# Patient Record
Sex: FEMALE | Race: WHITE | Employment: FULL TIME | URBAN - METROPOLITAN AREA
[De-identification: names, ages, dates, MRNs, and addresses within clinical notes are randomized per-mention and may not be internally consistent; named-entity substitution may affect disease eponyms.]

---

## 2018-05-01 LAB
ANTIBODY: NONREACTIVE
ANTIGEN INTERPRETATION: NONREACTIVE
CHLAMYDIA TRACHOMATIS AMPLIFIED DET: NEGATIVE
HIV AG/AB: NON REACTIVE
N GONORRHOEAE AMPLIFIED DET: NEGATIVE
RPR TITER: NORMAL
RPR: 0.01

## 2018-09-14 LAB
CHOLESTEROL, TOTAL: 198 MG/DL
CHOLESTEROL/HDL RATIO: 102
HDLC SERPL-MCNC: 96 MG/DL (ref 35–70)
LDL CHOLESTEROL CALCULATED: 89 MG/DL (ref 0–160)
TRIGL SERPL-MCNC: 63 MG/DL
VLDLC SERPL CALC-MCNC: ABNORMAL MG/DL

## 2019-10-18 LAB
ALBUMIN SERPL-MCNC: 4.8 G/DL
ALP BLD-CCNC: 68 U/L
ALT SERPL-CCNC: 21 U/L
AST SERPL-CCNC: 21 U/L
BILIRUB SERPL-MCNC: 0.5 MG/DL (ref 0.1–1.4)
BUN BLDV-MCNC: 17 MG/DL
CALCIUM SERPL-MCNC: 10.1 MG/DL
CHLORIDE BLD-SCNC: 100 MMOL/L
CHOLESTEROL, TOTAL: 199 MG/DL
CHOLESTEROL/HDL RATIO: 96
CO2: 27 MMOL/L
CREAT SERPL-MCNC: 0.92 MG/DL
GLUCOSE BLD-MCNC: 77 MG/DL
GLUCOSE FASTING: 77 MG/DL
HDLC SERPL-MCNC: 103 MG/DL (ref 35–70)
LDL CHOLESTEROL CALCULATED: 85 MG/DL (ref 0–160)
POTASSIUM SERPL-SCNC: 4.7 MMOL/L
SODIUM BLD-SCNC: 137 MMOL/L
TOTAL PROTEIN: 6.8 G/DL (ref 6.4–8.2)
TRIGL SERPL-MCNC: 54 MG/DL
TSH SERPL DL<=0.05 MIU/L-ACNC: 2.36 UIU/ML
VLDLC SERPL CALC-MCNC: ABNORMAL MG/DL

## 2020-01-22 ENCOUNTER — OFFICE VISIT (OUTPATIENT)
Dept: PRIMARY CARE CLINIC | Age: 50
End: 2020-01-22
Payer: COMMERCIAL

## 2020-01-22 VITALS
OXYGEN SATURATION: 100 % | DIASTOLIC BLOOD PRESSURE: 84 MMHG | HEART RATE: 60 BPM | HEIGHT: 64 IN | TEMPERATURE: 97 F | WEIGHT: 125 LBS | BODY MASS INDEX: 21.34 KG/M2 | SYSTOLIC BLOOD PRESSURE: 139 MMHG | RESPIRATION RATE: 16 BRPM

## 2020-01-22 PROCEDURE — 99203 OFFICE O/P NEW LOW 30 MIN: CPT | Performed by: PHYSICIAN ASSISTANT

## 2020-01-22 RX ORDER — CEFDINIR 300 MG/1
300 CAPSULE ORAL 2 TIMES DAILY
Qty: 14 CAPSULE | Refills: 0 | Status: SHIPPED | OUTPATIENT
Start: 2020-01-22 | End: 2020-01-29

## 2020-01-22 ASSESSMENT — ENCOUNTER SYMPTOMS
RHINORRHEA: 0
SINUS PRESSURE: 0
SORE THROAT: 0
CHEST TIGHTNESS: 0
SHORTNESS OF BREATH: 0
COUGH: 0

## 2020-02-04 ENCOUNTER — OFFICE VISIT (OUTPATIENT)
Dept: PRIMARY CARE CLINIC | Age: 50
End: 2020-02-04
Payer: COMMERCIAL

## 2020-02-04 VITALS
RESPIRATION RATE: 16 BRPM | BODY MASS INDEX: 21.34 KG/M2 | SYSTOLIC BLOOD PRESSURE: 110 MMHG | WEIGHT: 125 LBS | OXYGEN SATURATION: 100 % | DIASTOLIC BLOOD PRESSURE: 73 MMHG | TEMPERATURE: 97.9 F | HEIGHT: 64 IN | HEART RATE: 67 BPM

## 2020-02-04 PROCEDURE — 99213 OFFICE O/P EST LOW 20 MIN: CPT | Performed by: PHYSICIAN ASSISTANT

## 2020-02-04 ASSESSMENT — ENCOUNTER SYMPTOMS
SINUS PRESSURE: 0
RHINORRHEA: 0
SORE THROAT: 0
SHORTNESS OF BREATH: 0
COUGH: 0
CHEST TIGHTNESS: 0

## 2020-02-10 ENCOUNTER — TELEPHONE (OUTPATIENT)
Dept: FAMILY MEDICINE CLINIC | Age: 50
End: 2020-02-10

## 2020-02-10 ENCOUNTER — PROCEDURE VISIT (OUTPATIENT)
Dept: AUDIOLOGY | Age: 50
End: 2020-02-10
Payer: COMMERCIAL

## 2020-02-10 ENCOUNTER — OFFICE VISIT (OUTPATIENT)
Dept: ENT CLINIC | Age: 50
End: 2020-02-10
Payer: COMMERCIAL

## 2020-02-10 VITALS
HEART RATE: 70 BPM | DIASTOLIC BLOOD PRESSURE: 63 MMHG | TEMPERATURE: 97 F | HEIGHT: 64 IN | WEIGHT: 129.8 LBS | BODY MASS INDEX: 22.16 KG/M2 | SYSTOLIC BLOOD PRESSURE: 103 MMHG

## 2020-02-10 PROBLEM — H73.892 TYMPANIC MEMBRANE RETRACTION, LEFT: Status: ACTIVE | Noted: 2020-02-10

## 2020-02-10 PROBLEM — H74.8X2: Status: ACTIVE | Noted: 2020-02-10

## 2020-02-10 PROCEDURE — 92557 COMPREHENSIVE HEARING TEST: CPT | Performed by: AUDIOLOGIST

## 2020-02-10 PROCEDURE — 92504 EAR MICROSCOPY EXAMINATION: CPT | Performed by: OTOLARYNGOLOGY

## 2020-02-10 PROCEDURE — 99203 OFFICE O/P NEW LOW 30 MIN: CPT | Performed by: OTOLARYNGOLOGY

## 2020-02-10 PROCEDURE — 92567 TYMPANOMETRY: CPT | Performed by: AUDIOLOGIST

## 2020-02-10 RX ORDER — FLUOCINONIDE TOPICAL SOLUTION USP, 0.05% 0.5 MG/ML
SOLUTION TOPICAL 2 TIMES DAILY
COMMUNITY

## 2020-02-10 RX ORDER — SUMATRIPTAN 100 MG/1
100 TABLET, FILM COATED ORAL PRN
COMMUNITY
End: 2021-11-03 | Stop reason: SDUPTHER

## 2020-02-10 RX ORDER — FLUTICASONE PROPIONATE 50 MCG
1 SPRAY, SUSPENSION (ML) NASAL DAILY
COMMUNITY
End: 2020-02-10 | Stop reason: DRUGHIGH

## 2020-02-10 RX ORDER — DESONIDE 0.5 MG/G
CREAM TOPICAL 2 TIMES DAILY
COMMUNITY

## 2020-02-10 RX ORDER — KETOCONAZOLE 20 MG/ML
SHAMPOO TOPICAL DAILY PRN
COMMUNITY

## 2020-02-10 RX ORDER — FLUTICASONE PROPIONATE 50 MCG
1 SPRAY, SUSPENSION (ML) NASAL 2 TIMES DAILY
Qty: 1 BOTTLE | Refills: 1 | Status: SHIPPED | COMMUNITY
Start: 2020-02-10 | End: 2020-05-19 | Stop reason: SDUPTHER

## 2020-02-10 RX ORDER — CETIRIZINE HYDROCHLORIDE 10 MG/1
10 TABLET ORAL DAILY
COMMUNITY

## 2020-02-10 RX ORDER — KETOCONAZOLE 20 MG/G
CREAM TOPICAL DAILY
COMMUNITY

## 2020-02-10 NOTE — Clinical Note
Patient with Ray Castles Tube dysfunction, is also trying to establish primary care at [facility] - will refer her to your office for this

## 2020-02-10 NOTE — Clinical Note
Dr. Thompson Many you for your referral for audiometric testing on this patient. Please see the scanned audiogram (under \"Media\" tab) and encounter note for details. If you have any questions, or if there is anything else you need, please let me know. Chidi Knightiologist---[facility] - Audiology

## 2020-02-10 NOTE — PROGRESS NOTES
[address] [facility] NECK SURGERY  NEW PATIENT HISTORY AND PHYSICAL NOTE      Patient Name: Gilbert Everett  Medical Record Number:  [id number]  Primary Care Physician:  Tamera Newman MD    ChiefComplaint     Chief Complaint   Patient presents with    Tinnitus     Has been going on since she was 18 (slight in left ear). Has a lot of congestion in left ear and ringing is louder and constant. Feels like she can't get ear to clear up. History of Present Illness     Gilbert Everett is an 44 y.o. female with ringing in the left ear since her 25s (had an ear infection, following this had tinnitus in the left ear), feels aural fullness and worsened tinnitus in the left ear. States she had an upper respiratory infection and congestion at the beginning of the year; she was treated with two courses of antibiotics however left aural fullness has not resolved. Denies otalgia, otorrhea, vertigo, no history of ear trauma or surgery. No history of chronic ear disease a sa child,no FHx of early hearing loss. Audiometric testing shows normal hearing loss bilaterally, however left type B tympanogram on immitance testing. States history of seasonal allergies, takes fluticasone, cetirizine year-round. This provides relief of head congestion - denies history of chronic rhinosinusitis (no facial pain, no purulent/green rhinorrhea, no anosmia/hyposmia)    Past Medical History     Past Medical History:   Diagnosis Date    Allergic rhinitis     Anxiety     Headache     Rash     Recurrent upper respiratory infection (URI)     Tinnitus        Past Surgical History     History reviewed. No pertinent surgical history.     Family History     Family History   Problem Relation Age of Onset    No Known Problems Mother     No Known Problems Father     Diabetes Maternal Grandfather     Diabetes Paternal Grandmother        Social History     Social History     Tobacco Use    Smoking status: Never hyphae/fibers in the left external auditory canal  Post op: Q-tip fibers in the left external auditory canal, no inflammation/infection suspected  Procedure : Binocular otomicroscopy  Surgeon: EZEQUIEL Perry  Estimated Blood Loss: None    After obtaining consent, the patient was placed in the examination chair in the reclined position.      -Right ear: External auditory canal clear, tympanic membrane showing no retractions or perforations, middle ear without effusion  -Left ear: External auditory canal with Q-tip fibers appreciated, no fungal hyphae/inflammation,, tympanic membrane obscured by cerumen -portion over the promontory appears retracted, middle ear unable to be evaluated, tympanic membrane does not appear to move well with auto insufflation     * Patient tolerated the procedure well with no complications    Assessment and Plan     1. Tympanic membrane retraction, left  Patient with left tympanic membrane retraction of the promontory, however further evaluation of the remainder the tympanic membrane is difficult due to cerumen/Q-tip fibers.  -We will reevaluate in 6 weeks following a course of eardrops and recommendation for eustachian tube dysfunction as below  -Patient does not smoke, has no history of epistaxes or hyposmia/anosmia. If aural fullness continues, and tympanic membrane remains retracted, will plan for nasal endoscopy to rule out nasopharyngeal mass    2. ETD (Eustachian tube dysfunction), left  Patient with left-sided otalgia, aural fullness, popping/clicking, no autophony.  Symptoms consistent with left dilatory eustachian tube dysfunction  -Start nasal steroids BID  -Auto-insufflate ears (\"pop ears\") 4-5 times daily  -Will consider audiogram to determine any conductive hearing loss  -Have discussed diagnostic myringotomy and/or placement of tympanostomy tube, or Eustachean tube dilation under general anesthesia with patient - will proceed with conservative measures and re-evaluate  -RTC in 6

## 2020-02-10 NOTE — PROGRESS NOTES
Ty Gomez   1970, 44 y.o. female   [id number]       Referring Provider: Elisa Landis MD  Referral Type: In an order in [address] [place] [address]    Reason for Visit: Evaluation of the cause of disorders of hearing and tinnitus. ADULT AUDIOLOGIC EVALUATION      Ty Gomez is a 44 y.o. female seen today, 2/10/2020 , for an initial audiologic evaluation. Patient was seen by Elisa Landis MD following today's evaluation. AUDIOLOGIC AND OTHER PERTINENT MEDICAL HISTORY:      Ty Gomez noted decreased hearing of the left ear described as 'clogged' with intermittent tinnitus of the left ear that worsened after concerns for fluid build-up behind the tympanic membrane a couple months ago. Patient reports tinnitus of left ear started in her 25s after ear infection but appears to have worsened recently. She denies additional history of ear infections as an adult. No additional significant otologic or medical history was reported. Ty Gomez denied otalgia, otorrhea, dizziness, history of falls, history of occupational/recreational noise exposure, history of head trauma, history of ear surgery and family history of hearing loss. Date: 2/10/2020     ASSESSMENT AND FINDINGS:     Otoscopy revealed: Clear ear canals bilaterally    RIGHT EAR:  Hearing Sensitivity: Within normal limits. Speech Recognition Threshold: 5 dB HL  Word Recognition: Excellent (100%), based on NU-6 25-word list at 50 dBHL using recorded speech stimuli. Tympanometry: Normal peak pressure and compliance, Type A tympanogram, consistent with normal middle ear function. LEFT EAR:  Hearing Sensitivity: Within normal limits through 4kHz sloping to a mild hearing loss. Speech Recognition Threshold: 15 dB HL  Word Recognition: Excellent (96%), based on NU-6 25-word list at 50 dBHL using recorded speech stimuli.     Tympanometry: Normal peak pressure with low compliance, Type As tympanogram, consistent with reduced tympanic membrane

## 2020-02-10 NOTE — PATIENT INSTRUCTIONS
directly into a persons ear      Some additional items that may be helpful:   - Remain patient - this is important for both parties   - Write down items that still cannot be heard/understood. You may write with pen/paper or consider typing/texting on a cell phone or smart device. - If background noise is unavoidable, try to keep yourself in a good position in the room. By sitting at a shell on the side of the restaurant (preferably a corner), it will be easier to communicate than if you were sitting at a table in the middle with background noise surrounding you. Keep yourself positioned away from music speakers or heavy foot traffic.

## 2020-02-13 ENCOUNTER — OFFICE VISIT (OUTPATIENT)
Dept: FAMILY MEDICINE CLINIC | Age: 50
End: 2020-02-13
Payer: COMMERCIAL

## 2020-02-13 VITALS
WEIGHT: 129 LBS | BODY MASS INDEX: 22.14 KG/M2 | HEART RATE: 59 BPM | OXYGEN SATURATION: 99 % | DIASTOLIC BLOOD PRESSURE: 70 MMHG | SYSTOLIC BLOOD PRESSURE: 126 MMHG

## 2020-02-13 PROBLEM — G60.9 PERIPHERAL NEUROPATHY, IDIOPATHIC: Status: ACTIVE | Noted: 2018-11-13

## 2020-02-13 PROBLEM — G43.909 MIGRAINE WITHOUT STATUS MIGRAINOSUS, NOT INTRACTABLE: Status: ACTIVE | Noted: 2020-02-13

## 2020-02-13 PROBLEM — A60.00 GENITAL HERPES SIMPLEX: Status: ACTIVE | Noted: 2020-02-13

## 2020-02-13 PROBLEM — F40.243 FEAR OF FLYING: Status: ACTIVE | Noted: 2020-02-13

## 2020-02-13 PROBLEM — M47.812 SPONDYLOSIS WITHOUT MYELOPATHY OR RADICULOPATHY, CERVICAL REGION: Status: ACTIVE | Noted: 2018-11-13

## 2020-02-13 PROBLEM — M47.816 SPONDYLOSIS WITHOUT MYELOPATHY OR RADICULOPATHY, LUMBAR REGION: Status: ACTIVE | Noted: 2018-11-13

## 2020-02-13 PROBLEM — K21.9 ESOPHAGEAL REFLUX: Status: ACTIVE | Noted: 2020-02-13

## 2020-02-13 PROBLEM — G56.03 CARPAL TUNNEL SYNDROME, BILATERAL UPPER LIMBS: Status: ACTIVE | Noted: 2018-11-13

## 2020-02-13 PROCEDURE — 99204 OFFICE O/P NEW MOD 45 MIN: CPT | Performed by: FAMILY MEDICINE

## 2020-02-13 RX ORDER — HYDROXYZINE HYDROCHLORIDE 25 MG/1
12.5-25 TABLET, FILM COATED ORAL
COMMUNITY
Start: 2019-10-31 | End: 2021-11-03 | Stop reason: ALTCHOICE

## 2020-02-13 RX ORDER — AZELASTINE 1 MG/ML
1 SPRAY, METERED NASAL 2 TIMES DAILY
Qty: 1 BOTTLE | Refills: 5 | Status: SHIPPED | OUTPATIENT
Start: 2020-02-13 | End: 2020-07-23 | Stop reason: ALTCHOICE

## 2020-02-13 ASSESSMENT — ENCOUNTER SYMPTOMS
BACK PAIN: 1
COUGH: 0
BLOOD IN STOOL: 0
SHORTNESS OF BREATH: 0
COLOR CHANGE: 0
NAUSEA: 0
VOMITING: 0
TROUBLE SWALLOWING: 0
ABDOMINAL PAIN: 0
CONSTIPATION: 0
DIARRHEA: 0

## 2020-02-13 ASSESSMENT — PATIENT HEALTH QUESTIONNAIRE - PHQ9
SUM OF ALL RESPONSES TO PHQ QUESTIONS 1-9: 0
1. LITTLE INTEREST OR PLEASURE IN DOING THINGS: 0
2. FEELING DOWN, DEPRESSED OR HOPELESS: 0
SUM OF ALL RESPONSES TO PHQ QUESTIONS 1-9: 0
SUM OF ALL RESPONSES TO PHQ9 QUESTIONS 1 & 2: 0

## 2020-02-13 NOTE — PATIENT INSTRUCTIONS
symptoms get better. · Do not smoke or chew tobacco. Smoking can make GERD worse. If you need help quitting, talk to your doctor about stop-smoking programs and medicines. These can increase your chances of quitting for good. · If you have GERD symptoms at night, raise the head of your bed 6 to 8 inches by putting the frame on blocks or placing a foam wedge under the head of your mattress. (Adding extra pillows does not work.)  · Do not wear tight clothing around your middle. · Lose weight if you need to. Losing just 5 to 10 pounds can help. When should you call for help? Call your doctor now or seek immediate medical care if:    · You have new or different belly pain.     · Your stools are black and tarlike or have streaks of blood.    Watch closely for changes in your health, and be sure to contact your doctor if:    · Your symptoms have not improved after 2 days.     · Food seems to catch in your throat or chest.   Where can you learn more? Go to https://[employer]onda Research. org and sign in to your [employer] (now [employer]) account. Enter N492 in the GroupFlier box to learn more about \"Gastroesophageal Reflux Disease (GERD): Care Instructions. \"     If you do not have an account, please click on the \"Sign Up Now\" link. Current as of: August 11, 2019  Content Version: 12.3  © 4826-7183 Healthwise, Incorporated. Care instructions adapted under license by [facility] [address] If you have questions about a medical condition or this instruction, always ask your healthcare professional. [place] [address] any warranty or liability for your use of this information. Patient Education        Back Stretches: Exercises  Introduction  Here are some examples of exercises for stretching your back. Start each exercise slowly. Ease off the exercise if you start to have pain. Your doctor or physical therapist will tell you when you can start these exercises and which ones will work best for you.   How to do the exercises  Overhead stretch   1. Stand comfortably with your feet shoulder-width apart. 2. Looking straight ahead, raise both arms over your head and reach toward the ceiling. Do not allow your head to tilt back. 3. Hold for 15 to 30 seconds, then lower your arms to your sides. 4. Repeat 2 to 4 times. Side stretch   1. Stand comfortably with your feet shoulder-width apart. 2. Raise one arm over your head, and then lean to the other side. 3. Slide your hand down your leg as you let the weight of your arm gently stretch your side muscles. Hold for 15 to 30 seconds. 4. Repeat 2 to 4 times on each side. Press-up   1. Lie on your stomach, supporting your body with your forearms. 2. Press your elbows down into the floor to raise your upper back. As you do this, relax your stomach muscles and allow your back to arch without using your back muscles. As your press up, do not let your hips or pelvis come off the floor. 3. Hold for 15 to 30 seconds, then relax. 4. Repeat 2 to 4 times. Relax and rest   1. Lie on your back with a rolled towel under your neck and a pillow under your knees. Extend your arms comfortably to your sides. 2. Relax and breathe normally. 3. Remain in this position for about 10 minutes. 4. If you can, do this 2 or 3 times each day. Follow-up care is a key part of your treatment and safety. Be sure to make and go to all appointments, and call your doctor if you are having problems. It's also a good idea to know your test results and keep a list of the medicines you take. Where can you learn more? Go to https://Juxta Labsfunmieweb.Gada [employer]. org and sign in to your Gadget[employer]M account. Enter C879 in the Decisionlink box to learn more about \"Back Stretches: Exercises. \"     If you do not have an account, please click on the \"Sign Up Now\" link. Current as of: June 26, 2019  Content Version: 12.3  © 8641-5927 Healthwise, Incorporated.  Care instructions adapted under on a stool, ledge, or box. Exercise to strengthen your back and other muscles  · Get at least 30 minutes of exercise on most days of the week. Walking is a good choice. You also may want to do other activities, such as running, swimming, cycling, or playing tennis or team sports. · Stretch your back muscles. Here are few exercises to try:  ? Lie on your back with your knees bent and your feet flat on the floor. Gently pull one bent knee to your chest. Put that foot back on the floor, and then pull the other knee to your chest. Hold for 15 to 30 seconds. Repeat 2 to 4 times. ? Do pelvic tilts. Lie on your back with your knees bent. Tighten your stomach muscles. Pull your belly button (navel) in and up toward your ribs. You should feel like your back is pressing to the floor and your hips and pelvis are slightly lifting off the floor. Hold for 6 seconds while breathing smoothly. · Keep your core muscles strong. The muscles of your back, belly (abdomen), and buttocks support your spine. ? Pull in your belly, and imagine pulling your navel toward your spine. Hold this for 6 seconds, then relax. Remember to keep breathing normally as you tense your muscles. ? Do curl-ups. Always do them with your knees bent. Keep your low back on the floor, and curl your shoulders toward your knees using a smooth, slow motion. Keep your arms folded across your chest. If this bothers your neck, try putting your hands behind your neck (not your head), with your elbows spread apart. ? Lie on your back with your knees bent and your feet flat on the floor. Tighten your belly muscles, and then push with your feet and raise your buttocks up a few inches. Hold this position 6 seconds as you continue to breathe normally, then lower yourself slowly to the floor. Repeat 8 to 12 times. ? If you like group exercise, try Pilates or yoga. These classes have poses that strengthen the core muscles.   Protect your back when you sit  · Place a small about a medical condition or this instruction, always ask your healthcare professional. [address] any warranty or liability for your use of this information. Patient Education        Low Back Pain: Exercises  Introduction  Here are some examples of exercises for you to try. The exercises may be suggested for a condition or for rehabilitation. Start each exercise slowly. Ease off the exercises if you start to have pain. You will be told when to start these exercises and which ones will work best for you. How to do the exercises  Press-up   1. Lie on your stomach, supporting your body with your forearms. 2. Press your elbows down into the floor to raise your upper back. As you do this, relax your stomach muscles and allow your back to arch without using your back muscles. As your press up, do not let your hips or pelvis come off the floor. 3. Hold for 15 to 30 seconds, then relax. 4. Repeat 2 to 4 times. Alternate arm and leg (bird dog) exercise   1. Start on the floor, on your hands and knees. 2. Tighten your belly muscles. 3. Raise one leg off the floor, and hold it straight out behind you. Be careful not to let your hip drop down, because that will twist your trunk. 4. Hold for about 6 seconds, then lower your leg and switch to the other leg. 5. Repeat 8 to 12 times on each leg. 6. Over time, work up to holding for 10 to 30 seconds each time. 7. If you feel stable and secure with your leg raised, try raising the opposite arm straight out in front of you at the same time. Knee-to-chest exercise   1. Lie on your back with your knees bent and your feet flat on the floor. 2. Bring one knee to your chest, keeping the other foot flat on the floor (or keeping the other leg straight, whichever feels better on your lower back). 3. Keep your lower back pressed to the floor. Hold for at least 15 to 30 seconds.   4. Relax, and lower the knee to the starting 30 seconds. Do not arch your back, point your toes, or bend either knee. Keep one heel touching the floor and the other heel touching the wall. 4. Repeat with your other leg. 5. Do 2 to 4 times for each leg. Hip flexor stretch   1. Kneel on the floor with one knee bent and one leg behind you. Place your forward knee over your foot. Keep your other knee touching the floor. 2. Slowly push your hips forward until you feel a stretch in the upper thigh of your rear leg. 3. Hold the stretch for at least 15 to 30 seconds. Repeat with your other leg. 4. Do 2 to 4 times on each side. Wall sit   1. Stand with your back 10 to 12 inches away from a wall. 2. Lean into the wall until your back is flat against it. 3. Slowly slide down until your knees are slightly bent, pressing your lower back into the wall. 4. Hold for about 6 seconds, then slide back up the wall. 5. Repeat 8 to 12 times. Follow-up care is a key part of your treatment and safety. Be sure to make and go to all appointments, and call your doctor if you are having problems. It's also a good idea to know your test results and keep a list of the medicines you take. Where can you learn more? Go to https://Crowd Vision.Advanced Sports Logic. org and sign in to your Artspace account. Enter B139 in the MarginLeft box to learn more about \"Low Back Pain: Exercises. \"     If you do not have an account, please click on the \"Sign Up Now\" link. Current as of: June 26, 2019  Content Version: 12.3  © 4127-7560 Healthwise, Incorporated. Care instructions adapted under license by [facility] If you have questions about a medical condition or this instruction, always ask your healthcare professional. [place] [address] any warranty or liability for your use of this information.

## 2020-02-13 NOTE — PROGRESS NOTES
unspecified trigger  Switch anti-histamine given length of use, trial astelin  - azelastine (ASTELIN) 0.1 % nasal spray; 1 spray by Nasal route 2 times daily Use in each nostril as directed  Dispense: 1 Bottle; Refill: 5    5. Spondylosis without myelopathy or radiculopathy, lumbar region  Will return to [facility]  Discussed scheduled NSAIDs with food, heat/exercises/ice regiment    6. Situational anxiety  - hydrOXYzine (ATARAX) 25 MG tablet; Take 12.5-25 mg by mouth  7. Fear of flying  - hydrOXYzine (ATARAX) 25 MG tablet; Take 12.5-25 mg by mouth  Discouraged Ativan use    8. Psoriasis  Sees Dermatology     9. Migraine without status migrainosus, not intractable, unspecified migraine type  Imitrex PRN    10. Gastroesophageal reflux disease, esophagitis presence not specified  Pepcid PRN  Discussed dietary influences in detail and provided hand out discussing this. Highly encouraged this as a first step to improvement. If medication was prescribed, AEs described in detail. Long term medication use if no longer recommended. There were no red flags to warrant referral for immediate EGD, including unintentional WL, hematemesis, melena, dysphagia, etc.     11. Genital herpes simplex, unspecified site  Valtrex PRN with Sx onset. While assessing care for this patient, I have reviewed all pertinent lab work/imaging/ specialist notes and care in reference to those problems addressed above in detail. Appropriate medical decision making was based on this. Please note that portions of this note may have been completed with a voice recognition program. Efforts were made to edit the dictations but occasionally words are mis-transcribed.     Return in about 8 months (around 10/20/2020) for annual physical.

## 2020-05-19 RX ORDER — FLUTICASONE PROPIONATE 50 MCG
1 SPRAY, SUSPENSION (ML) NASAL 2 TIMES DAILY
Qty: 1 BOTTLE | Refills: 5 | Status: SHIPPED | OUTPATIENT
Start: 2020-05-19 | End: 2021-02-22

## 2020-05-19 NOTE — TELEPHONE ENCOUNTER
Patient is requesting a 90 day supply of the following pended medication(s) to be sent to [facility]. It is now the patient's preferred pharmacy for all maintenance medications due to cost effectiveness.      [address], [address] [place] [address]  Phone: [phone]

## 2020-07-06 ENCOUNTER — PATIENT MESSAGE (OUTPATIENT)
Dept: FAMILY MEDICINE CLINIC | Age: 50
End: 2020-07-06

## 2020-07-06 RX ORDER — VALACYCLOVIR HYDROCHLORIDE 1 G/1
2000 TABLET, FILM COATED ORAL DAILY
Qty: 90 TABLET | Refills: 0 | Status: SHIPPED | OUTPATIENT
Start: 2020-07-06 | End: 2021-11-03 | Stop reason: SDUPTHER

## 2020-07-06 RX ORDER — BISACODYL 5 MG
TABLET, DELAYED RELEASE (ENTERIC COATED) ORAL
Qty: 4 TABLET | Refills: 0 | Status: SHIPPED | OUTPATIENT
Start: 2020-07-06 | End: 2020-07-23

## 2020-07-06 RX ORDER — POLYETHYLENE GLYCOL 3350 17 G/17G
POWDER, FOR SOLUTION ORAL
Qty: 238 G | Refills: 0 | Status: SHIPPED | OUTPATIENT
Start: 2020-07-06 | End: 2020-07-23 | Stop reason: ALTCHOICE

## 2020-07-06 NOTE — TELEPHONE ENCOUNTER
From: Ovidio Block  To: Juan Shah MD  Sent: 7/6/2020 8:56 AM EDT  Subject: Prescription Question    Hello,  I need to get a refill on Valtrex, but did not see a way to do that through the Request a Refill page, so thought I would try to send you a note. I usually get 1 GM through the mail order pharmacy, so I get the best cost and I don't have to refill very often. I am using the [facility] mail order [facility].     Thanks,  Anat Santos

## 2020-07-13 ENCOUNTER — EMPLOYEE WELLNESS (OUTPATIENT)
Dept: OTHER | Age: 50
End: 2020-07-13

## 2020-07-13 LAB
CHOLESTEROL, TOTAL: 190 MG/DL (ref 0–199)
GLUCOSE BLD-MCNC: 79 MG/DL (ref 70–99)
HDLC SERPL-MCNC: 99 MG/DL (ref 40–60)
LDL CHOLESTEROL CALCULATED: 83 MG/DL
TRIGL SERPL-MCNC: 42 MG/DL (ref 0–150)

## 2020-07-15 ENCOUNTER — E-VISIT (OUTPATIENT)
Dept: INTERNAL MEDICINE CLINIC | Age: 50
End: 2020-07-15
Payer: COMMERCIAL

## 2020-07-15 PROCEDURE — 99422 OL DIG E/M SVC 11-20 MIN: CPT | Performed by: INTERNAL MEDICINE

## 2020-07-15 RX ORDER — FLUCONAZOLE 150 MG/1
150 TABLET ORAL ONCE
Qty: 1 TABLET | Refills: 0 | Status: SHIPPED | OUTPATIENT
Start: 2020-07-15 | End: 2020-07-15

## 2020-07-15 NOTE — PROGRESS NOTES
11-20 minutes were spent on the digital evaluation and management of this patient. Diagnoses and all orders for this visit:    Vaginal yeast infection  -     fluconazole (DIFLUCAN) 150 MG tablet;  Take 1 tablet by mouth once for 1 dose

## 2020-07-16 LAB
3-OH-COTININE: <2 NG/ML
COTININE: <2 NG/ML
NICOTINE: <2 NG/ML

## 2020-07-21 ENCOUNTER — E-VISIT (OUTPATIENT)
Dept: FAMILY MEDICINE CLINIC | Age: 50
End: 2020-07-21

## 2020-07-22 ENCOUNTER — TELEPHONE (OUTPATIENT)
Dept: FAMILY MEDICINE CLINIC | Age: 50
End: 2020-07-22

## 2020-07-22 RX ORDER — FLUCONAZOLE 150 MG/1
150 TABLET ORAL ONCE
Qty: 1 TABLET | Refills: 0 | Status: SHIPPED | OUTPATIENT
Start: 2020-07-22 | End: 2020-07-22

## 2020-07-22 NOTE — PROGRESS NOTES
Reviewed questionnaire    Reviewed meds/allergies    Dx Vaginal discharge    Plan Rx given for diflucan, follow up with PCP if symptoms do not improve with use of med    Time spent on visit 5 min

## 2020-07-22 NOTE — TELEPHONE ENCOUNTER
----- Message from Juan Mckinnon sent at 7/22/2020  1:36 PM EDT -----  Subject: Message to Provider    QUESTIONS  Information for Provider? Pt is calling because she thinks she may have a   UTI or yeast infection. Pt states she has had it for like 2 weeks   symptoms are urgency to urine   discharge( no odor)  She has taken diflucan ( last one 7/15) Please advise. No availability   with Dr. Jericho Nova.  ---------------------------------------------------------------------------  --------------  Jyoti WRIGHT  What is the best way for the office to contact you? Do not leave any   message   patient will call back for answer  Preferred Call Back Phone Number? [phone]  ---------------------------------------------------------------------------  --------------  SCRIPT ANSWERS  Relationship to Patient?  Self

## 2020-07-23 ENCOUNTER — OFFICE VISIT (OUTPATIENT)
Dept: FAMILY MEDICINE CLINIC | Age: 50
End: 2020-07-23
Payer: COMMERCIAL

## 2020-07-23 VITALS
BODY MASS INDEX: 22.14 KG/M2 | SYSTOLIC BLOOD PRESSURE: 120 MMHG | OXYGEN SATURATION: 100 % | HEART RATE: 62 BPM | DIASTOLIC BLOOD PRESSURE: 80 MMHG | TEMPERATURE: 97.4 F | WEIGHT: 129 LBS

## 2020-07-23 PROBLEM — R31.21 ASYMPTOMATIC MICROSCOPIC HEMATURIA: Status: ACTIVE | Noted: 2020-07-23

## 2020-07-23 LAB
BILIRUBIN, POC: NEGATIVE
BLOOD URINE, POC: ABNORMAL
CLARITY, POC: CLEAR
COLOR, POC: YELLOW
GLUCOSE URINE, POC: NEGATIVE
KETONES, POC: NEGATIVE
LEUKOCYTE EST, POC: NEGATIVE
NITRITE, POC: NEGATIVE
PH, POC: 6
PROTEIN, POC: NEGATIVE
SPECIFIC GRAVITY, POC: <=1.005
UROBILINOGEN, POC: NEGATIVE

## 2020-07-23 PROCEDURE — 99214 OFFICE O/P EST MOD 30 MIN: CPT | Performed by: FAMILY MEDICINE

## 2020-07-23 PROCEDURE — 81002 URINALYSIS NONAUTO W/O SCOPE: CPT | Performed by: FAMILY MEDICINE

## 2020-07-23 RX ORDER — FLUCONAZOLE 150 MG/1
150 TABLET ORAL ONCE
Qty: 1 TABLET | Refills: 0 | Status: SHIPPED | OUTPATIENT
Start: 2020-07-23 | End: 2020-07-23

## 2020-07-23 RX ORDER — METRONIDAZOLE 500 MG/1
500 TABLET ORAL 2 TIMES DAILY
Qty: 14 TABLET | Refills: 0 | Status: SHIPPED | OUTPATIENT
Start: 2020-07-23 | End: 2020-07-30

## 2020-07-23 ASSESSMENT — ENCOUNTER SYMPTOMS
COLOR CHANGE: 0
ABDOMINAL PAIN: 1
NAUSEA: 0
VOMITING: 0
BACK PAIN: 0

## 2020-07-23 NOTE — PROGRESS NOTES
Not on file     Relationship status: Not on file    Intimate partner violence     Fear of current or ex partner: Not on file     Emotionally abused: Not on file     Physically abused: Not on file     Forced sexual activity: Not on file   Other Topics Concern    Not on file   Social History Narrative    Not on file       Family History   Problem Relation Age of Onset    No Known Problems Mother     No Known Problems Father     Diabetes Maternal Grandfather     Diabetes Paternal Grandmother        Current Outpatient Medications   Medication Sig Dispense Refill    metroNIDAZOLE (FLAGYL) 500 MG tablet Take 1 tablet by mouth 2 times daily for 7 days 14 tablet 0    fluconazole (DIFLUCAN) 150 MG tablet Take 1 tablet by mouth once for 1 dose 1 tablet 0    valACYclovir (VALTREX) 1 g tablet Take 2 tablets by mouth daily 90 tablet 0    fluticasone (FLONASE) 50 MCG/ACT nasal spray 1 spray by Each Nostril route 2 times daily 1 Bottle 5    hydrOXYzine (ATARAX) 25 MG tablet Take 12.5-25 mg by mouth      cetirizine (ZYRTEC) 10 MG tablet Take 10 mg by mouth daily      SUMAtriptan (IMITREX) 100 MG tablet Take 100 mg by mouth as needed for Migraine      Famotidine (PEPCID PO) Take by mouth as needed      Multiple Vitamin (MULTI-VITAMIN DAILY PO) Take by mouth daily      CALCIUM PO Take by mouth daily      Omega-3 Fatty Acids (FISH OIL PO) Take by mouth daily      ketoconazole (NIZORAL) 2 % shampoo Apply topically daily as needed for Itching (psoriasis) Apply topically daily as needed.  fluocinonide (LIDEX) 0.05 % external solution Apply topically 2 times daily Apply topically 2 times daily.  desonide (DESOWEN) 0.05 % cream Apply topically 2 times daily Apply topically 2 times daily.  ketoconazole (NIZORAL) 2 % cream Apply topically daily Apply topically daily. No current facility-administered medications for this visit.         Immunization History   Administered Date(s) Administered   [facility] Influenza Virus Vaccine 10/11/2011, 11/07/2014, 11/01/2016    Influenza, Adam Adal, IM, PF (6 mo and older Fluzone, Flulaval, Fluarix, and 3 yrs and older Afluria) 10/18/2018    Influenza, Quadv, Recombinant, IM PF (Flublok 18 yrs and older) 10/31/2019    Tdap (Boostrix, Adacel) 09/08/2009, 10/31/2019    Zoster Recombinant (Shingrix) 10/31/2019       No Known Allergies    Review of Systems   Constitutional: Negative for activity change, appetite change, chills, diaphoresis, fatigue, fever and unexpected weight change. Gastrointestinal: Positive for abdominal pain. Negative for nausea and vomiting. Genitourinary: Positive for frequency, urgency and vaginal discharge. Negative for decreased urine volume, difficulty urinating, dyspareunia, dysuria, flank pain, genital sores, hematuria, menstrual problem, pelvic pain, vaginal bleeding and vaginal pain. Musculoskeletal: Negative for back pain and myalgias. Skin: Negative for color change and rash. Allergic/Immunologic: Negative for immunocompromised state. Neurological: Negative for light-headedness and headaches. Psychiatric/Behavioral: Negative for sleep disturbance. /80 (Site: Left Upper Arm, Position: Sitting, Cuff Size: Medium Adult)   Pulse 62   Temp 97.4 °F (36.3 °C) (Temporal)   Wt 129 lb (58.5 kg)   LMP 06/01/2019   SpO2 100%   BMI 22.14 kg/m²     Physical Exam  Vitals signs and nursing note reviewed. Constitutional:       General: She is not in acute distress. Appearance: She is well-developed. She is not diaphoretic. HENT:      Head: Normocephalic and atraumatic. Eyes:      Pupils: Pupils are equal, round, and reactive to light. Neck:      Musculoskeletal: Normal range of motion and neck supple. Cardiovascular:      Rate and Rhythm: Normal rate and regular rhythm. Pulmonary:      Effort: Pulmonary effort is normal. No respiratory distress. Breath sounds: Normal breath sounds.    Abdominal:      General: Bowel sounds are normal.      Palpations: Abdomen is soft. Genitourinary:     Labia:         Right: No rash, tenderness, lesion or injury. Left: No rash, tenderness, lesion or injury. Vagina: No signs of injury and foreign body. Vaginal discharge (moderate, clear ) present. No erythema, tenderness or bleeding. Cervix: Discharge (moderate, clear ) present. No cervical motion tenderness or friability. Musculoskeletal: Normal range of motion. General: No tenderness. Skin:     General: Skin is warm and dry. Capillary Refill: Capillary refill takes 2 to 3 seconds. Coloration: Skin is not pale. Findings: No erythema or rash. Neurological:      General: No focal deficit present. Mental Status: She is alert. Psychiatric:         Mood and Affect: Mood normal.         Behavior: Behavior normal.         Thought Content: Thought content normal.         Judgment: Judgment normal.         Plan  1. Vaginal discharge  Vaginal panel and G/C ordered  Clinically appears to be BV    2. Increased urinary frequency  + trace bl  - POCT Urinalysis no Micro    3. BV (bacterial vaginosis)  Supportive care discussed: increased PO water hydration, clean hygiene, wiping technique, loose fitting cotton underwear, urinating after sexual intercourse, role of probiotics, etc. Discouraged alcohol use   - metroNIDAZOLE (FLAGYL) 500 MG tablet; Take 1 tablet by mouth 2 times daily for 7 days  Dispense: 14 tablet; Refill: 0    4. Asymptomatic microscopic hematuria  Will need repeat UA in 6-8 weeks. Discussed. Likely from pelvic exam as she urinated after. While assessing care for this patient, I have reviewed all pertinent lab work/imaging/ specialist notes and care in reference to those problems addressed above in detail. Appropriate medical decision making was based on this.      Please note that portions of this note may have been completed with a voice recognition program. Efforts were made

## 2020-07-23 NOTE — PATIENT INSTRUCTIONS
Patient Education        Knee: Exercises  Introduction  Here are some examples of exercises for you to try. The exercises may be suggested for a condition or for rehabilitation. Start each exercise slowly. Ease off the exercises if you start to have pain. You will be told when to start these exercises and which ones will work best for you. How to do the exercises  Quad sets   1. Sit with your leg straight and supported on the floor or a firm bed. (If you feel discomfort in the front or back of your knee, place a small towel roll under your knee.)  2. Tighten the muscles on top of your thigh by pressing the back of your knee flat down to the floor. (If you feel discomfort under your kneecap, place a small towel roll under your knee.)  3. Hold for about 6 seconds, then rest for up to 10 seconds. 4. Do 8 to 12 repetitions several times a day. Straight-leg raises to the front   1. Lie on your back with your good knee bent so that your foot rests flat on the floor. Your injured leg should be straight. Make sure that your low back has a normal curve. You should be able to slip your flat hand in between the floor and the small of your back, with your palm touching the floor and your back touching the back of your hand. 2. Tighten the thigh muscles in the injured leg by pressing the back of your knee flat down to the floor. Hold your knee straight. 3. Keeping the thigh muscles tight, lift your injured leg up so that your heel is about 12 inches off the floor. Hold for about 6 seconds and then lower slowly. 4. Do 8 to 12 repetitions, 3 times a day. Straight-leg raises to the outside   1. Lie on your side, with your injured leg on top. 2. Tighten the front thigh muscles of your injured leg to keep your knee straight. 3. Keep your hip and your leg straight in line with the rest of your body, and keep your knee pointing forward. Do not drop your hip back.   4. Lift your injured leg straight up toward the ceiling, about 12 inches off the floor. Hold for about 6 seconds, then slowly lower your leg. 5. Do 8 to 12 repetitions. Straight-leg raises to the back   1. Lie on your stomach, and lift your leg straight up behind you (toward the ceiling). 2. Lift your toes about 6 inches off the floor, hold for about 6 seconds, then lower slowly. 3. Do 8 to 12 repetitions. Straight-leg raises to the inside   1. Lie on the side of your body with the injured leg. 2. You can either prop your other (good) leg up on a chair, or you can bend your good knee and put that foot in front of your injured knee. Do not drop your hip back. 3. Tighten the muscles on the front of your thigh to straighten your injured knee. 4. Keep your kneecap pointing forward, and lift your whole leg up toward the ceiling about 6 inches. Hold for about 6 seconds, then lower slowly. 5. Do 8 to 12 repetitions. Heel dig bridging   1. Lie on your back with both knees bent and your ankles bent so that only your heels are digging into the floor. Your knees should be bent about 90 degrees. 2. Then push your heels into the floor, squeeze your buttocks, and lift your hips off the floor until your shoulders, hips, and knees are all in a straight line. 3. Hold for about 6 seconds as you continue to breathe normally, and then slowly lower your hips back down to the floor and rest for up to 10 seconds. 4. Do 8 to 12 repetitions. Hamstring curls   1. Lie on your stomach with your knees straight. If your kneecap is uncomfortable, roll up a washcloth and put it under your leg just above your kneecap. 2. Lift the foot of your injured leg by bending the knee so that you bring the foot up toward your buttock. If this motion hurts, try it without bending your knee quite as far. This may help you avoid any painful motion. 3. Slowly lower your leg back to the floor. 4. Do 8 to 12 repetitions.   5. With permission from your doctor or physical therapist, you may also want to add a cuff weight to your ankle (not more than 5 pounds). With weight, you do not have to lift your leg more than 12 inches to get a hamstring workout. Shallow standing knee bends   Do this exercise only if you have very little pain; if you have no clicking, locking, or giving way if you have an injured knee; and if it does not hurt while you are doing 8 to 12 repetitions. 1. Stand with your hands lightly resting on a counter or chair in front of you. Put your feet shoulder-width apart. 2. Slowly bend your knees so that you squat down like you are going to sit in a chair. Make sure your knees do not go in front of your toes. 3. Lower yourself about 6 inches. Your heels should remain on the floor at all times. 4. Rise slowly to a standing position. Heel raises   1. Stand with your feet a few inches apart, with your hands lightly resting on a counter or chair in front of you. 2. Slowly raise your heels off the floor while keeping your knees straight. 3. Hold for about 6 seconds, then slowly lower your heels to the floor. 4. Do 8 to 12 repetitions several times during the day. Follow-up care is a key part of your treatment and safety. Be sure to make and go to all appointments, and call your doctor if you are having problems. It's also a good idea to know your test results and keep a list of the medicines you take. Where can you learn more? Go to https://[employer].SkiApps.com. org and sign in to your [employer] account. Enter O828 in the [facility] box to learn more about \"Knee: Exercises. \"     If you do not have an account, please click on the \"Sign Up Now\" link. Current as of: March 2, 2020               Content Version: 12.5  © 2732-6126 Healthwise, Incorporated. Care instructions adapted under license by [facility] [address]  If you have questions about a medical condition or this instruction, always ask your healthcare professional. [address] any warranty or liability for your use of this information. Patient Education        Patellar Tendinitis (Jumper's Knee): Exercises  Introduction  Here are some examples of exercises for you to try. The exercises may be suggested for a condition or for rehabilitation. Start each exercise slowly. Ease off the exercises if you start to have pain. You will be told when to start these exercises and which ones will work best for you. How to do the exercises  Straight-leg raises to the front   5. Lie on your back with your good knee bent so that your foot rests flat on the floor. Your affected leg should be straight. Make sure that your low back has a normal curve. You should be able to slip your hand in between the floor and the small of your back, with your palm touching the floor and your back touching the back of your hand. 6. Tighten the thigh muscles in your affected leg by pressing the back of your knee flat down to the floor. Hold your knee straight. 7. Keeping the thigh muscles tight and your leg straight, lift your leg up so that your heel is about 12 inches off the floor. 8. Hold for about 6 seconds, then lower your leg slowly. Rest for up to 10 seconds between repetitions. 9. Repeat 8 to 12 times. Short-arc quad   5. Lie on your back with your knees bent over a foam roll or large rolled-up towel and your heels on the floor. 6. Lift the lower part of your affected leg until your leg is straight. Keep the back of your knee on the foam roll or towel. 7. Hold your leg straight for about 6 seconds, then slowly bend your knee and lower your heel back to the floor. Rest for up to 10 seconds between repetitions. 8. Repeat 8 to 12 times. Half-squat with knees and feet turned out to the side   6. Stand with your feet about shoulder-width apart and turned out to the side about 45 degrees. 7. Keep your back straight, and tighten your buttocks.   8. Slowly bend your knees to lower your body about one-quarter of the way down toward the floor. Try to keep your back straight at all times, and do not let your pelvis tilt forward or your knees extend beyond the tip of your toes. 9. Repeat 8 to 12 times. Step up   4. Place a single-step footstool on the floor. If you do not have a footstool, you can use a thick book, such as a phone book, a dictionary, or an encyclopedia. If you are not steady on your feet, hold on to a chair, counter, or wall while you do this exercise. 5. Keeping your back straight, step up with your affected leg. Try not to push off your back leg as you step up. Only use your affected leg to bring you up on to the step. Then lift your other leg on to the step. As you step up, try to keep your knee moving in a straight line with your middle toe. 6. Move back to the starting position, with both feet on the floor. 7. Repeat 8 to 12 times. Step down   6. Stand on a single-step footstool. If you do not have a footstool, you can use a thick book, such as a phone book, a dictionary, or an encyclopedia. If you are not steady on your feet, hold on to a chair, counter, or wall while you do this exercise. 7. Slowly step down with your good leg, allowing your heel to lightly touch the floor. As you step down, try to keep your affected knee moving in a straight line toward your middle toe. 8. Move back to the starting position, with both feet on the footstool or book. 9. Repeat 8 to 12 times. Terminal knee extension   5. Tie the ends of an exercise band together to form a loop. Attach one end of the loop to a secure object, or shut a door on it to hold it in place. (Or you can have someone hold one end of the loop to provide resistance.)  6. Loop the other end of the exercise band around the knee of your affected leg. Keep that leg somewhat bent at the knee. 7. Put your good leg about a step behind your affected leg.  Then slowly straighten your affected leg by tightening the thigh muscles of that leg. 8. Hold for about 6 seconds, then return to the starting position, with your knee somewhat bent. 9. Rest for up to 10 seconds. 10. Repeat 8 to 12 times. Follow-up care is a key part of your treatment and safety. Be sure to make and go to all appointments, and call your doctor if you are having problems. It's also a good idea to know your test results and keep a list of the medicines you take. Where can you learn more? Go to https://[employer]peLastline.[employer]. org and sign in to your [employer] account. Enter S680 in the [facility] box to learn more about \"Patellar Tendinitis (Jumper's Knee): Exercises. \"     If you do not have an account, please click on the \"Sign Up Now\" link. Current as of: March 2, 2020               Content Version: 12.5  © 2006-2020 [employer]. Care instructions adapted under license by [facility] ([facility]). If you have questions about a medical condition or this instruction, always ask your healthcare professional. [address] any warranty or liability for your use of this information. Patient Education        Patellofemoral Pain Syndrome (Runner's Knee): Exercises  Introduction  Here are some examples of exercises for you to try. The exercises may be suggested for a condition or for rehabilitation. Start each exercise slowly. Ease off the exercises if you start to have pain. You will be told when to start these exercises and which ones will work best for you. How to do the exercises  Calf wall stretch   10. Stand facing a wall with your hands on the wall at about eye level. Put your affected leg about a step behind your other leg. 6. Keeping your back leg straight and your back heel on the floor, bend your front knee and gently bring your hip and chest toward the wall until you feel a stretch in the calf of your back leg. 12. Hold the stretch for at least 15 to 30 seconds. 13. Repeat 2 to 4 times.   14. Repeat steps 1 through 4, but this time keep your back knee bent. Quadriceps stretch   9. If you are not steady on your feet, hold on to a chair, counter, or wall. [address] your affected leg, and reach behind you to grab the front of your foot or ankle with the hand on the same side. For example, if you are stretching your right leg, use your right hand. 11. Keeping your knees next to each other, pull your foot toward your buttock until you feel a gentle stretch across the front of your hip and down the front of your thigh. Your knee should be pointed directly to the ground, and not out to the side. 12. Hold the stretch for at least 15 to 30 seconds. 13. Repeat 2 to 4 times. Hamstring wall stretch   10. Lie on your back in a doorway, with your good leg through the open door. 11. Slide your affected leg up the wall to straighten your knee. You should feel a gentle stretch down the back of your leg. 12. Hold the stretch for at least 1 minute. Then over time, try to lengthen the time you hold the stretch to as long as 6 minutes. 13. Repeat 2 to 4 times. 14. If you do not have a place to do this exercise in a doorway, there is another way to do it:  15. Lie on your back, and bend your affected leg. 16. Loop a towel under the ball and toes of that foot, and hold the ends of the towel in your hands. 17. Straighten your knee, and slowly pull back on the towel. You should feel a gentle stretch down the back of your leg. 18. Hold the stretch for at least 15 to 30 seconds. Or even better, hold the stretch for 1 minute if you can. 19. Repeat 2 to 4 times. 8. Do not arch your back. 9. Do not bend either knee. 10. Keep one heel touching the floor and the other heel touching the wall. Do not point your toes. Quad sets   10. Sit with your affected leg straight and supported on the floor or a firm bed. Place a small, rolled-up towel under your affected knee.  Your other leg should be bent, with that foot flat on the better. You need to take the full course of antibiotics. · Do not eat or drink anything that contains alcohol if you are taking metronidazole or tinidazole. · Keep using your medicine if you start your period. Use pads instead of tampons while using a vaginal cream or suppository. Tampons can absorb the medicine. · Wear loose cotton clothing. Do not wear nylon and other materials that hold body heat and moisture close to the skin. · Do not scratch. Relieve itching with a cold pack or a cool bath. · Do not wash your vaginal area more than once a day. Use plain water or a mild, unscented soap. Do not douche. When should you call for help? Watch closely for changes in your health, and be sure to contact your doctor if:  · You have unexpected vaginal bleeding. · You have a fever. · You have new or increased pain in your vagina or pelvis. · You are not getting better after 1 week. · Your symptoms return after you finish the course of your medicine. Where can you learn more? Go to https://[employer].too.me. org and sign in to your [employer] account. Enter M311 in the [facility] box to learn more about \"Bacterial Vaginosis: Care Instructions. \"     If you do not have an account, please click on the \"Sign Up Now\" link. Current as of: November 8, 2019               Content Version: 12.5  © 9097-2458 Healthwise, Incorporated. Care instructions adapted under license by [address] If you have questions about a medical condition or this instruction, always ask your healthcare professional. [place] [address] any warranty or liability for your use of this information.

## 2020-07-24 LAB
C TRACH DNA GENITAL QL NAA+PROBE: NEGATIVE
CANDIDA SPECIES, DNA PROBE: NORMAL
GARDNERELLA VAGINALIS, DNA PROBE: NORMAL
N. GONORRHOEAE DNA: NEGATIVE
TRICHOMONAS VAGINALIS DNA: NORMAL

## 2020-08-03 ENCOUNTER — OFFICE VISIT (OUTPATIENT)
Dept: PRIMARY CARE CLINIC | Age: 50
End: 2020-08-03
Payer: COMMERCIAL

## 2020-08-03 PROCEDURE — 99211 OFF/OP EST MAY X REQ PHY/QHP: CPT | Performed by: NURSE PRACTITIONER

## 2020-08-03 NOTE — PROGRESS NOTES
Prudence Meals received a viral test for COVID-19. They were educated on isolation and quarantine as appropriate. For any symptoms, they were directed to seek care from their PCP, given contact information to establish with a doctor, directed to an urgent care or the emergency room.

## 2020-08-04 LAB — SARS-COV-2, NAA: NOT DETECTED

## 2020-08-07 ENCOUNTER — HOSPITAL ENCOUNTER (OUTPATIENT)
Age: 50
Setting detail: OUTPATIENT SURGERY
Discharge: HOME | End: 2020-08-07
Attending: INTERNAL MEDICINE | Admitting: INTERNAL MEDICINE
Payer: COMMERCIAL

## 2020-08-07 VITALS
RESPIRATION RATE: 16 BRPM | OXYGEN SATURATION: 100 % | SYSTOLIC BLOOD PRESSURE: 106 MMHG | DIASTOLIC BLOOD PRESSURE: 65 MMHG | HEART RATE: 53 BPM | TEMPERATURE: 97.6 F

## 2020-08-07 PROCEDURE — 7100000011 HC PHASE II RECOVERY - ADDTL 15 MIN: Performed by: INTERNAL MEDICINE

## 2020-08-07 PROCEDURE — 3609027000 HC COLONOSCOPY: Performed by: INTERNAL MEDICINE

## 2020-08-07 PROCEDURE — 2709999900 HC NON-CHARGEABLE SUPPLY: Performed by: INTERNAL MEDICINE

## 2020-08-07 PROCEDURE — 7100000010 HC PHASE II RECOVERY - FIRST 15 MIN: Performed by: INTERNAL MEDICINE

## 2020-08-07 PROCEDURE — 99152 MOD SED SAME PHYS/QHP 5/>YRS: CPT | Performed by: INTERNAL MEDICINE

## 2020-08-07 PROCEDURE — 45378 DIAGNOSTIC COLONOSCOPY: CPT | Performed by: INTERNAL MEDICINE

## 2020-08-07 PROCEDURE — 2580000003 HC RX 258: Performed by: INTERNAL MEDICINE

## 2020-08-07 PROCEDURE — 99153 MOD SED SAME PHYS/QHP EA: CPT | Performed by: INTERNAL MEDICINE

## 2020-08-07 PROCEDURE — 6360000002 HC RX W HCPCS: Performed by: INTERNAL MEDICINE

## 2020-08-07 RX ORDER — SODIUM CHLORIDE, SODIUM LACTATE, POTASSIUM CHLORIDE, CALCIUM CHLORIDE 600; 310; 30; 20 MG/100ML; MG/100ML; MG/100ML; MG/100ML
INJECTION, SOLUTION INTRAVENOUS ONCE
Status: COMPLETED | OUTPATIENT
Start: 2020-08-07 | End: 2020-08-07

## 2020-08-07 RX ORDER — MIDAZOLAM HYDROCHLORIDE 5 MG/ML
INJECTION INTRAMUSCULAR; INTRAVENOUS PRN
Status: DISCONTINUED | OUTPATIENT
Start: 2020-08-07 | End: 2020-08-07 | Stop reason: ALTCHOICE

## 2020-08-07 RX ORDER — FENTANYL CITRATE 50 UG/ML
INJECTION, SOLUTION INTRAMUSCULAR; INTRAVENOUS PRN
Status: DISCONTINUED | OUTPATIENT
Start: 2020-08-07 | End: 2020-08-07 | Stop reason: ALTCHOICE

## 2020-08-07 RX ADMIN — SODIUM CHLORIDE, POTASSIUM CHLORIDE, SODIUM LACTATE AND CALCIUM CHLORIDE: 600; 310; 30; 20 INJECTION, SOLUTION INTRAVENOUS at 07:54

## 2020-08-07 ASSESSMENT — PAIN - FUNCTIONAL ASSESSMENT: PAIN_FUNCTIONAL_ASSESSMENT: 0-10

## 2020-08-07 ASSESSMENT — PAIN SCALES - GENERAL
PAINLEVEL_OUTOF10: 0

## 2020-08-07 NOTE — H&P
Christophe [address],  [address] [facility]  Phone: [phone] [facility],  [address] [facility], [address] [place] [address]  Phone: [phone]    HPI     Thank you Akosua Espinoza MD for asking me to see Mike Dudley in consultation. She is a  [4] White [1] 44 y.o. Collette Mallorys female seen independently who presents with the following GI complaints:    Mike Dudley presents for screening colonoscopy. There is no history of colon polyps or cancer. There is no family history of colon polyps or cancer. She is not experiencing any symptoms presently. Last Encounter Reviewed:   Pertinent PMH, FH, SH is reviewed below. Last EGD: none  Last Colonoscopy: none    No components found for: [address] HISTORY     Past Medical History:   Diagnosis Date    Allergic rhinitis     Anxiety     Esophageal reflux 2/13/2020    Headache     Rash     Recurrent upper respiratory infection (URI)     Spondylosis without myelopathy or radiculopathy, cervical region 11/13/2018    Tinnitus      FAMILY HISTORY     Family History   Problem Relation Age of Onset    No Known Problems Mother     No Known Problems Father     Diabetes Maternal Grandfather     Diabetes Paternal Grandmother      SOCIAL HISTORY     Social History     Socioeconomic History    Marital status:      Spouse name: Not on file    Number of children: Not on file    Years of education: Not on file    Highest education level: Not on file   Occupational History    Not on file   Social Needs    Financial resource strain: Not on file    Food insecurity     Worry: Not on file     Inability: Not on file    Transportation needs     Medical: Not on file     Non-medical: Not on file   Tobacco Use    Smoking status: Never Smoker    Smokeless tobacco: Never Used   Substance and Sexual Activity    Alcohol use: Yes     Comment: occasionally    Drug use:  No  Sexual activity: Not on file   Lifestyle    Physical activity     Days per week: Not on file     Minutes per session: Not on file    Stress: Not on file   Relationships    Social connections     Talks on phone: Not on file     Gets together: Not on file     Attends Sikhism service: Not on file     Active member of club or organization: Not on file     Attends meetings of clubs or organizations: Not on file     Relationship status: Not on file    Intimate partner violence     Fear of current or ex partner: Not on file     Emotionally abused: Not on file     Physically abused: Not on file     Forced sexual activity: Not on file   Other Topics Concern    Not on file   Social History Narrative    Not on file     SURGICAL HISTORY   History reviewed. No pertinent surgical history. CURRENT MEDICATIONS   (This list may include medications prescribed during this encounter as epic can not insert only the list prior to this encounter.)  No current facility-administered medications on file prior to encounter. Current Outpatient Medications on File Prior to Encounter   Medication Sig Dispense Refill    valACYclovir (VALTREX) 1 g tablet Take 2 tablets by mouth daily 90 tablet 0    fluticasone (FLONASE) 50 MCG/ACT nasal spray 1 spray by Each Nostril route 2 times daily 1 Bottle 5    cetirizine (ZYRTEC) 10 MG tablet Take 10 mg by mouth daily      Famotidine (PEPCID PO) Take by mouth as needed      Multiple Vitamin (MULTI-VITAMIN DAILY PO) Take by mouth daily      CALCIUM PO Take by mouth daily      Omega-3 Fatty Acids (FISH OIL PO) Take by mouth daily      ketoconazole (NIZORAL) 2 % shampoo Apply topically daily as needed for Itching (psoriasis) Apply topically daily as needed.  fluocinonide (LIDEX) 0.05 % external solution Apply topically 2 times daily Apply topically 2 times daily.  desonide (DESOWEN) 0.05 % cream Apply topically 2 times daily Apply topically 2 times daily.       ketoconazole (NIZORAL) 2 % cream Apply topically daily Apply topically daily.  hydrOXYzine (ATARAX) 25 MG tablet Take 12.5-25 mg by mouth      SUMAtriptan (IMITREX) 100 MG tablet Take 100 mg by mouth as needed for Migraine       ALLERGIES   No Known Allergies  IMMUNIZATIONS     Immunization History   Administered Date(s) Administered    Influenza Virus Vaccine 10/11/2011, 11/07/2014, 11/01/2016    Influenza, Quadv, IM, PF (6 mo and older Fluzone, Flulaval, Fluarix, and 3 yrs and older Afluria) 10/18/2018    Influenza, Quadv, Recombinant, IM PF (Flublok 18 yrs and older) 10/31/2019    Tdap (Boostrix, Adacel) 09/08/2009, 10/31/2019    Zoster Recombinant (Shingrix) 10/31/2019     REVIEW OF SYSTEMS   See HPI for further details and pertinent postiives. Negative for the following:  Constitutional: Negative for weight change. Negative for appetite change and fatigue. HENT: Negative for nosebleeds, sore throat, mouth sores, and voice change. Respiratory: Negative for cough, choking and chest tightness. Cardiovascular: Negative for chest pain   Gastrointestinal: See HPI  Musculoskeletal: Negative for arthralgias. Skin: Negative for pallor. Neurological: Negative for weakness and light-headedness. Hematological: Negative for adenopathy. Does not bruise/bleed easily. Psychiatric/Behavioral: Negative for suicidal ideas. PHYSICAL EXAM   VITAL SIGNS:  Vitals:    08/07/20 0747   BP: 107/74   Pulse: 82   Resp: 18   Temp: 97.6 °F (36.4 °C)   SpO2: 100%     Wt Readings from Last 3 Encounters:   07/23/20 129 lb (58.5 kg)   07/13/20 130 lb (59 kg)   02/13/20 129 lb (58.5 kg)     Constitutional: Well developed, Well nourished, No acute distress, Non-toxic appearance. HENT: Normocephalic, Atraumatic, Bilateral external ears normal, Oropharynx moist, No oral exudates, Nose normal.   Eyes: Conjunctiva normal, No discharge. Neck: Normal range of motion, No tenderness, Supple, No stridor.    Lymphatic: No lymphadenopathy noted. Cardiovascular: Normal heart rate, Normal rhythm, No murmurs, No rubs, No gallops. Thorax & Lungs: Normal breath sounds, No respiratory distress, No wheezing, No chest tenderness. Abdomen: scars consistent with stated surgeries,  Soft NTND   Rectal:  Deferred. Skin: Warm, Dry, No erythema, No rash. Back: No tenderness, No CVA tenderness. Extremities: Intact distal pulses, No edema, No tenderness, No cyanosis, No clubbing. Neurologic: Alert & oriented x 3, Normal motor function, Normal sensory function, No focal deficits noted. RADIOLOGY/PROCEDURES     Reviewed per [address] reviewed per epic    FOLLOWUP     Screening Colonoscopy    The patient was counseled at length about the risks of ousmane Covid-19 during their perioperative period and any recovery window from their procedure. The patient was made aware that ousmane Covid-19  may worsen their prognosis for recovering from their procedure  and lend to a higher morbidity and/or mortality risk. All material risks, benefits, and reasonable alternatives including postponing the procedure were discussed. The patient does wish to proceed with the procedure at this time. Preprocedural COVID-19 throat swab was negative.         Alfredo Fowler 8/7/20 8:24 AM EDT    CC:  Juan Shah MD

## 2020-08-07 NOTE — OP NOTE
Darion Bustos     [facility] ,  [address] [facility]  Phone: [phone] [facility] [address],  [address] [facility], [facility] Brenda Shah  Phone: [phone]   [phone]    Colonoscopy Procedure Note    Patient: Gregory Starkey  : 1970    Procedure: Screening Colonoscopy    Date:  2020     Endoscopist:  Gill Coy MD    Referring Physician:  Chalo Mehta MD    Preoperative Diagnosis:  Screening Colon    Postoperative Diagnosis:  See impression    Anesthesia: Anesthesia: Moderate Sedation    Sedation: Versed 9 mg IV, fentanyl 100 mcg IV  Start Time: 8:36  Stop Time: 8:55  ASA Class: 2  Mallampati: I (soft palate, uvula, fauces, tonsillar pillars visible)    Procedure Details  Informed consent was obtained for the procedure, including sedation. Risks of perforation, hemorrhage, adverse drug reaction and aspiration were discussed. The patient was placed in the left lateral decubitus position. Based on the pre-procedure assessment, including review of the patient's medical history, medications, allergies, and review of systems, she had been deemed to be an appropriate candidate for sedation; she was therefore sedated with the medications listed below. The patient was monitored continuously with ECG tracing, pulse oximetry, blood pressure monitoring, and direct observations. rectal examination was performed. There were no external hemorrhoids, fissures or skin tags. The colonoscope was inserted into the rectum and advanced under direct vision to the cecum, which was identified by the ileocecal valve and appendiceal orifice which were photographed. The right colon was examined twice as this increases polyp detection especially if other right colon polyps, older age, male, or galloway syndrome. When segments could not be distended with CO2 or air, it was filled/distended with water. Colonoscope was then slowly withdrawn.   Each colonic segmentwas evaluated carefully. Care was taken to inspect the proximal aspects of the IC valve, haustral folds, as well as flexures. The quality of the colonic preparation was good. A careful inspection was made as the colonoscope was withdrawn, including a retroflexed view of the rectum; findings and interventions are described below. Appropriate photodocumentation Was Obtained. Findings: -normal colonic mucosa throughout  - PREP: miralax  - Overall difficulty: mild in degree  - Abdominal pressure: no  - Change in position: no  - Anesthesia issues: no  - Medivator use: no    Specimens: Was Not Obtained    Complications:   None; patient tolerated the procedure well. Disposition:   PACU - hemodynamically stable. Estimated Blood loss:  none    Withdrawal Time:  7 minutes    Impression:   -Normal colonoscopy to the cecum, with no evidence of neoplasia, diverticular disease, or mucosal abnormality. Recommendations:  -For colon cancer screening in this average-risk patient, colonoscopy may be repeated in 10 years.         Shane Mike 8/7/20 9:00 AM EDT

## 2020-09-10 ENCOUNTER — TELEPHONE (OUTPATIENT)
Dept: FAMILY MEDICINE CLINIC | Age: 50
End: 2020-09-10

## 2020-09-10 NOTE — TELEPHONE ENCOUNTER
Lvm for pt to call to reschedule physical scheduled for 10/16/20. Dr. Cat Gomez schedule has changed, and she only does vv's on Fridays. Insurance will not pay for a physical as a vv. Please assist in rescheduling.

## 2020-10-19 VITALS — BODY MASS INDEX: 22.31 KG/M2 | WEIGHT: 130 LBS

## 2020-10-27 DIAGNOSIS — Z00.00 ANNUAL PHYSICAL EXAM: ICD-10-CM

## 2020-10-27 LAB
A/G RATIO: 2.2 (ref 1.1–2.2)
ALBUMIN SERPL-MCNC: 4.4 G/DL (ref 3.4–5)
ALP BLD-CCNC: 81 U/L (ref 40–129)
ALT SERPL-CCNC: 16 U/L (ref 10–40)
ANION GAP SERPL CALCULATED.3IONS-SCNC: 8 MMOL/L (ref 3–16)
AST SERPL-CCNC: 18 U/L (ref 15–37)
BASOPHILS ABSOLUTE: 0 K/UL (ref 0–0.2)
BASOPHILS RELATIVE PERCENT: 0.3 %
BILIRUB SERPL-MCNC: 0.7 MG/DL (ref 0–1)
BUN BLDV-MCNC: 18 MG/DL (ref 7–20)
CALCIUM SERPL-MCNC: 9.7 MG/DL (ref 8.3–10.6)
CHLORIDE BLD-SCNC: 104 MMOL/L (ref 99–110)
CHOLESTEROL, TOTAL: 202 MG/DL (ref 0–199)
CO2: 27 MMOL/L (ref 21–32)
CREAT SERPL-MCNC: 0.8 MG/DL (ref 0.6–1.1)
EOSINOPHILS ABSOLUTE: 0.1 K/UL (ref 0–0.6)
EOSINOPHILS RELATIVE PERCENT: 1.6 %
GFR AFRICAN AMERICAN: >60
GFR NON-AFRICAN AMERICAN: >60
GLOBULIN: 2 G/DL
GLUCOSE BLD-MCNC: 86 MG/DL (ref 70–99)
HCT VFR BLD CALC: 40.3 % (ref 36–48)
HDLC SERPL-MCNC: 89 MG/DL (ref 40–60)
HEMOGLOBIN: 13.4 G/DL (ref 12–16)
LDL CHOLESTEROL CALCULATED: 101 MG/DL
LYMPHOCYTES ABSOLUTE: 1.6 K/UL (ref 1–5.1)
LYMPHOCYTES RELATIVE PERCENT: 36.3 %
MCH RBC QN AUTO: 33.6 PG (ref 26–34)
MCHC RBC AUTO-ENTMCNC: 33.4 G/DL (ref 31–36)
MCV RBC AUTO: 100.6 FL (ref 80–100)
MONOCYTES ABSOLUTE: 0.3 K/UL (ref 0–1.3)
MONOCYTES RELATIVE PERCENT: 7.3 %
NEUTROPHILS ABSOLUTE: 2.4 K/UL (ref 1.7–7.7)
NEUTROPHILS RELATIVE PERCENT: 54.5 %
PDW BLD-RTO: 13.4 % (ref 12.4–15.4)
PLATELET # BLD: 243 K/UL (ref 135–450)
PMV BLD AUTO: 8.8 FL (ref 5–10.5)
POTASSIUM SERPL-SCNC: 5 MMOL/L (ref 3.5–5.1)
RBC # BLD: 4 M/UL (ref 4–5.2)
SODIUM BLD-SCNC: 139 MMOL/L (ref 136–145)
TOTAL PROTEIN: 6.4 G/DL (ref 6.4–8.2)
TRIGL SERPL-MCNC: 58 MG/DL (ref 0–150)
TSH SERPL DL<=0.05 MIU/L-ACNC: 2.1 UIU/ML (ref 0.27–4.2)
VITAMIN D 25-HYDROXY: 44.7 NG/ML
VLDLC SERPL CALC-MCNC: 12 MG/DL
WBC # BLD: 4.3 K/UL (ref 4–11)

## 2020-11-02 ENCOUNTER — OFFICE VISIT (OUTPATIENT)
Dept: FAMILY MEDICINE CLINIC | Age: 50
End: 2020-11-02
Payer: COMMERCIAL

## 2020-11-02 VITALS
OXYGEN SATURATION: 96 % | DIASTOLIC BLOOD PRESSURE: 70 MMHG | TEMPERATURE: 96.9 F | BODY MASS INDEX: 22.31 KG/M2 | SYSTOLIC BLOOD PRESSURE: 110 MMHG | HEART RATE: 53 BPM | WEIGHT: 130 LBS

## 2020-11-02 PROBLEM — M21.6X2 PRONATION OF BOTH FEET: Status: ACTIVE | Noted: 2020-11-02

## 2020-11-02 PROBLEM — N95.1 HOT FLASHES DUE TO MENOPAUSE: Status: ACTIVE | Noted: 2020-11-02

## 2020-11-02 PROBLEM — M21.6X1 PRONATION OF BOTH FEET: Status: ACTIVE | Noted: 2020-11-02

## 2020-11-02 PROCEDURE — 99396 PREV VISIT EST AGE 40-64: CPT | Performed by: FAMILY MEDICINE

## 2020-11-02 ASSESSMENT — ENCOUNTER SYMPTOMS
BACK PAIN: 0
DIARRHEA: 0
CONSTIPATION: 0
BLOOD IN STOOL: 0
ABDOMINAL PAIN: 0
VOMITING: 0
TROUBLE SWALLOWING: 0
SHORTNESS OF BREATH: 0
NAUSEA: 0
COLOR CHANGE: 0
COUGH: 0

## 2020-11-02 NOTE — PATIENT INSTRUCTIONS
Patient Education        Well Visit, Women 48 to 72: Care Instructions  Your Care Instructions     Physical exams can help you stay healthy. Your doctor has checked your overall health and may have suggested ways to take good care of yourself. He or she also may have recommended tests. At home, you can help prevent illness with healthy eating, regular exercise, and other steps. Follow-up care is a key part of your treatment and safety. Be sure to make and go to all appointments, and call your doctor if you are having problems. It's also a good idea to know your test results and keep a list of the medicines you take. How can you care for yourself at home? · Reach and stay at a healthy weight. This will lower your risk for many problems, such as obesity, diabetes, heart disease, and high blood pressure. · Get at least 30 minutes of exercise on most days of the week. Walking is a good choice. You also may want to do other activities, such as running, swimming, cycling, or playing tennis or team sports. · Do not smoke. Smoking can make health problems worse. If you need help quitting, talk to your doctor about stop-smoking programs and medicines. These can increase your chances of quitting for good. · Protect your skin from too much sun. When you're outdoors from 10 a.m. to 4 p.m., stay in the shade or cover up with clothing and a hat with a wide brim. Wear sunglasses that block UV rays. Even when it's cloudy, put broad-spectrum sunscreen (SPF 30 or higher) on any exposed skin. · See a dentist one or two times a year for checkups and to have your teeth cleaned. · Wear a seat belt in the car. Follow your doctor's advice about when to have certain tests. These tests can spot problems early. · Cholesterol. Your doctor will tell you how often to have this done based on your age, family history, or other things that can increase your risk for heart attack and stroke. · Blood pressure.  Have your blood pressure you call for help? Watch closely for changes in your health, and be sure to contact your doctor if you have any problems or symptoms that concern you. Where can you learn more? Go to https://[employer].healthSimbionix. org and sign in to your [employer] account. Enter J014 in the [employer] box to learn more about \"Well Visit, Women 50 to 72: Care Instructions. \"     If you do not have an account, please click on the \"Sign Up Now\" link. Current as of: May 27, 2020               Content Version: 12.6  © 6110-5712 [employer]. Care instructions adapted under license by [facility] ([facility]). If you have questions about a medical condition or this instruction, always ask your healthcare professional. [address] any warranty or liability for your use of this information.

## 2020-11-02 NOTE — PROGRESS NOTES
Carina Flores  YOB: 1970    Date of Service:  11/2/2020    Chief Complaint:   Carina Flores is a 44 y.o. female who presents for a preventative visit     HPI:    Patient's last menstrual period was 06/23/2019.   menstrual cycle: n/a  Sexual activity: has sex with male, boyfriend   AbnormalSxs: yes - discharge     Last PAP: 5/10/18, normal with neg HPV     Last Mammogram: yes - 10/3/19, normal  Family Hx of ovarian, breast, or uterine cancer: no  Self-breast exams: yes - no concerns     Previous Colonoscopy yes - 8/7/20, Dr. Sandra Liu, normal, 10 yr follow up   BRBR, unexplained WL, bloating, abd pain No  Family Hx of Colon Ca  no    Exercise: more active at home, belong to [facility], treadmill in her basement, going to the gym once a week to do a class   Diet:   ?iron supplement     Wt Readings from Last 3 Encounters:   11/02/20 130 lb (59 kg)   07/23/20 129 lb (58.5 kg)   07/13/20 130 lb (59 kg)     BP Readings from Last 3 Encounters:   11/02/20 110/70   08/07/20 106/65   07/23/20 120/80       Patient Active Problem List   Diagnosis    Tympanic membrane retraction, left    Type b tympanogram, left    Allergic rhinitis    Carpal tunnel syndrome, bilateral upper limbs    Chronic bilateral low back pain with bilateral sciatica    Chronic neck pain    Closed dislocation of tarsal joint    Congenital talipes valgus    Esophageal reflux    Fear of flying    Migraine without status migrainosus, not intractable    Peripheral neuropathy, idiopathic    Psoriasis    Situational anxiety    Spondylosis without myelopathy or radiculopathy, cervical region    Spondylosis without myelopathy or radiculopathy, lumbar region    Genital herpes simplex    Asymptomatic microscopic hematuria    Hot flashes due to menopause    Pronation of both feet       Health Maintenance   Topic Date Due    Shingles Vaccine (2 of 2) 12/26/2019    Cervical cancer screen  05/10/2021    Breast cancer screen  10/03/2021    Lipid screen  10/27/2025    DTaP/Tdap/Td vaccine (3 - Td) 10/31/2029    Colon cancer screen colonoscopy  08/07/2030    Flu vaccine  Completed    HIV screen  Completed    Hepatitis A vaccine  Aged Out    Hepatitis B vaccine  Aged Out    Hib vaccine  Aged Out    Meningococcal (ACWY) vaccine  Aged Out    Pneumococcal 0-64 years Vaccine  Aged [employer] History   Administered Date(s) Administered    Influenza Virus Vaccine 10/11/2011, 11/07/2014, 11/01/2016    Influenza, Jin Starch, IM, PF (6 mo and older Fluzone, Flulaval, Fluarix, and 3 yrs and older Afluria) 10/18/2018, 09/29/2020    Influenza, Jin Starch, Recombinant, IM PF (Flublok 18 yrs and older) 10/31/2019    Tdap (Boostrix, Adacel) 09/08/2009, 10/31/2019    Zoster Recombinant (Shingrix) 10/31/2019       No Known Allergies  Current Outpatient Medications   Medication Sig Dispense Refill    valACYclovir (VALTREX) 1 g tablet Take 2 tablets by mouth daily 90 tablet 0    fluticasone (FLONASE) 50 MCG/ACT nasal spray 1 spray by Each Nostril route 2 times daily 1 Bottle 5    hydrOXYzine (ATARAX) 25 MG tablet Take 12.5-25 mg by mouth      cetirizine (ZYRTEC) 10 MG tablet Take 10 mg by mouth daily      SUMAtriptan (IMITREX) 100 MG tablet Take 100 mg by mouth as needed for Migraine      Famotidine (PEPCID PO) Take by mouth as needed      Multiple Vitamin (MULTI-VITAMIN DAILY PO) Take by mouth daily      CALCIUM PO Take by mouth daily      Omega-3 Fatty Acids (FISH OIL PO) Take by mouth daily      ketoconazole (NIZORAL) 2 % shampoo Apply topically daily as needed for Itching (psoriasis) Apply topically daily as needed.  fluocinonide (LIDEX) 0.05 % external solution Apply topically 2 times daily Apply topically 2 times daily.  desonide (DESOWEN) 0.05 % cream Apply topically 2 times daily Apply topically 2 times daily.  ketoconazole (NIZORAL) 2 % cream Apply topically daily Apply topically daily.        No current facility-administered medications for this visit.         Past Medical History:   Diagnosis Date    Allergic rhinitis     Anxiety     Esophageal reflux 2/13/2020    Headache     Rash     Recurrent upper respiratory infection (URI)     Spondylosis without myelopathy or radiculopathy, cervical region 11/13/2018    Tinnitus      Past Surgical History:   Procedure Laterality Date    COLONOSCOPY  08/07/2020    NL    COLONOSCOPY N/A 8/7/2020    COLONOSCOPY DIAGNOSTIC performed by Varinder Parker MD at [address] [place] [address] Road History   Problem Relation Age of Onset    No Known Problems Mother     No Known Problems Father     Diabetes Maternal Grandfather     Diabetes Paternal Grandmother      Social History     Socioeconomic History    Marital status:      Spouse name: Not on file    Number of children: Not on file    Years of education: Not on file    Highest education level: Not on file   Occupational History    Not on file   Social Needs    Financial resource strain: Not on file    Food insecurity     Worry: Not on file     Inability: Not on file    Transportation needs     Medical: Not on file     Non-medical: Not on file   Tobacco Use    Smoking status: Never Smoker    Smokeless tobacco: Never Used   Substance and Sexual Activity    Alcohol use: Yes     Comment: occasionally    Drug use: No    Sexual activity: Not on file   Lifestyle    Physical activity     Days per week: Not on file     Minutes per session: Not on file    Stress: Not on file   Relationships    Social connections     Talks on phone: Not on file     Gets together: Not on file     Attends Anglican service: Not on file     Active member of club or organization: Not on file     Attends meetings of clubs or organizations: Not on file     Relationship status: Not on file    Intimate partner violence     Fear of current or ex partner: Not on file     Emotionally abused: Not on file     Physically abused: Not on file     Forced sexual activity: Not on file   Other Topics Concern    Not on file   Social History Narrative    Not on file       Review of Systems:  Review of Systems   Constitutional: Negative for activity change, appetite change, chills, diaphoresis, fatigue, fever and unexpected weight change. HENT: Negative for ear pain, hearing loss and trouble swallowing. Eyes: Negative for visual disturbance. Respiratory: Negative for cough and shortness of breath. Cardiovascular: Negative for chest pain, palpitations and leg swelling. Gastrointestinal: Negative for abdominal pain, blood in stool, constipation, diarrhea, nausea and vomiting. Genitourinary: Negative for decreased urine volume, difficulty urinating, dysuria, flank pain, hematuria and urgency. Musculoskeletal: Negative for arthralgias and back pain. Skin: Negative for color change and rash. Neurological: Negative for dizziness, weakness, light-headedness, numbness and headaches. Psychiatric/Behavioral: Negative for dysphoric mood and sleep disturbance. The patient is not nervous/anxious. Physical Exam:   Vitals:    11/02/20 0824   BP: 110/70   Site: Left Upper Arm   Position: Sitting   Cuff Size: Medium Adult   Pulse: 53   Temp: 96.9 °F (36.1 °C)   TempSrc: Temporal   SpO2: 96%   Weight: 130 lb (59 kg)     Body mass index is 22.31 kg/m². Physical Exam  Vitals signs and nursing note reviewed. Constitutional:       General: She is not in acute distress. Appearance: She is well-developed. She is not diaphoretic. HENT:      Head: Normocephalic and atraumatic. Right Ear: External ear normal.      Left Ear: External ear normal.      Mouth/Throat:      Pharynx: No oropharyngeal exudate. Eyes:      General:         Right eye: No discharge. Left eye: No discharge. Conjunctiva/sclera: Conjunctivae normal.      Pupils: Pupils are equal, round, and reactive to light.    Neck:      Musculoskeletal: Normal range of motion and neck supple. Thyroid: No thyromegaly. Cardiovascular:      Rate and Rhythm: Normal rate and regular rhythm. Heart sounds: Normal heart sounds. No murmur. No friction rub. No gallop. Pulmonary:      Effort: Pulmonary effort is normal. No respiratory distress. Breath sounds: Normal breath sounds. No wheezing or rales. Abdominal:      General: Bowel sounds are normal. There is no distension. Palpations: Abdomen is soft. Tenderness: There is no abdominal tenderness. There is no guarding or rebound. Musculoskeletal: Normal range of motion. Lymphadenopathy:      Cervical: No cervical adenopathy. Skin:     General: Skin is warm and dry. Coloration: Skin is not pale. Findings: No erythema or rash. Neurological:      Mental Status: She is alert. Cranial Nerves: No cranial nerve deficit. Coordination: Coordination normal.   Psychiatric:         Behavior: Behavior normal.         Thought Content: Thought content normal.         Judgment: Judgment normal.         Assessment/Plan:  1. Annual physical exam  Labs reviewed  UTD pap smear, Colonoscopy  Shingles vaccine at the pharmacy, discussed    2. Encounter for screening mammogram for malignant neoplasm of breast  - PAULINE DIGITAL SCREEN W OR WO CAD BILATERAL; Future    3. Pronation of both feet  [facility] [address] [facility]  - DME Order for Orthosis as OP    4. Hot flashes due to menopause  Discussed lifestyle modifications in detail, discussed trialing SSRI if Sxs become unmanageable. While assessing care for this patient, I have reviewed all pertinent lab work/imaging/ specialist notes and care in reference to those problems addressed above in detail. Appropriate medical decision making was based on this.      Please note that portions of this note may have been completed with a voice recognition program. Efforts were made to edit the dictations but occasionally words are

## 2021-02-22 RX ORDER — FLUTICASONE PROPIONATE 50 MCG
SPRAY, SUSPENSION (ML) NASAL
Qty: 1 BOTTLE | Refills: 5 | Status: SHIPPED | OUTPATIENT
Start: 2021-02-22 | End: 2021-11-03 | Stop reason: SDUPTHER

## 2021-04-29 ENCOUNTER — HOSPITAL ENCOUNTER (OUTPATIENT)
Dept: WOMENS IMAGING | Age: 51
Discharge: HOME | End: 2021-04-29
Payer: COMMERCIAL

## 2021-04-29 DIAGNOSIS — Z12.31 ENCOUNTER FOR SCREENING MAMMOGRAM FOR BREAST CANCER: ICD-10-CM

## 2021-04-29 PROCEDURE — 77063 BREAST TOMOSYNTHESIS BI: CPT

## 2021-06-21 LAB
CHOLESTEROL, TOTAL: 205 MG/DL (ref 0–199)
GLUCOSE BLD-MCNC: 75 MG/DL (ref 70–99)
HDLC SERPL-MCNC: 92 MG/DL (ref 40–60)
LDL CHOLESTEROL CALCULATED: 106 MG/DL
TRIGL SERPL-MCNC: 37 MG/DL (ref 0–150)

## 2021-07-08 ENCOUNTER — OFFICE VISIT (OUTPATIENT)
Dept: FAMILY MEDICINE CLINIC | Age: 51
End: 2021-07-08
Payer: COMMERCIAL

## 2021-07-08 VITALS
SYSTOLIC BLOOD PRESSURE: 100 MMHG | HEIGHT: 63 IN | WEIGHT: 122.6 LBS | OXYGEN SATURATION: 97 % | DIASTOLIC BLOOD PRESSURE: 62 MMHG | HEART RATE: 82 BPM | BODY MASS INDEX: 21.72 KG/M2

## 2021-07-08 DIAGNOSIS — Z12.4 CERVICAL CANCER SCREENING: Primary | ICD-10-CM

## 2021-07-08 DIAGNOSIS — N89.8 VAGINAL DRYNESS: ICD-10-CM

## 2021-07-08 DIAGNOSIS — Z11.51 SCREENING FOR HPV (HUMAN PAPILLOMAVIRUS): ICD-10-CM

## 2021-07-08 DIAGNOSIS — R23.2 HOT FLASHES: ICD-10-CM

## 2021-07-08 RX ORDER — MULTIVIT-MIN/IRON/FOLIC ACID/K 18-600-40
1 CAPSULE ORAL DAILY
COMMUNITY

## 2021-07-08 RX ORDER — CONJUGATED ESTROGENS 0.62 MG/G
CREAM VAGINAL
Qty: 1 TUBE | Refills: 3 | Status: SHIPPED | OUTPATIENT
Start: 2021-07-08 | End: 2022-11-04 | Stop reason: ALTCHOICE

## 2021-07-08 ASSESSMENT — ENCOUNTER SYMPTOMS
CONSTIPATION: 0
SHORTNESS OF BREATH: 0
NAUSEA: 0
BACK PAIN: 0
COLOR CHANGE: 0
ABDOMINAL PAIN: 0
VOMITING: 0
ABDOMINAL DISTENTION: 0
DIARRHEA: 0

## 2021-07-08 NOTE — PATIENT INSTRUCTIONS
change. If you have a high-risk type of human papillomavirus (HPV) or cell changes that could turn into cancer, you may need more tests. Your doctor may suggest that you wait to be retested. Or you may need to have a colposcopy or treatment right away. Your doctor will recommend a follow-up plan based on your results and your age. Follow-up care is a key part of your treatment and safety. Be sure to make and go to all appointments, and call your doctor if you are having problems. It's also a good idea to know your test results and keep a list of the medicines you take. Where can you learn more? Go to https://TSBpepiceweb.[email or web address] org and sign in to your [employer] account. Enter P919 in the [employer] box to learn more about \"Learning About Cervical Cancer Screening. \"     If you do not have an account, please click on the \"Sign Up Now\" link. Current as of: December 17, 2020               Content Version: 12.9  © 2006-2021 [employer]. Care instructions adapted under license by [facility] ([place] [facility]). If you have questions about a medical condition or this instruction, always ask your healthcare professional. [place] [address] any warranty or liability for your use of this information. Patient Education        Hot Flashes During Menopause: Care Instructions  Your Care Instructions     A hot flash is a sudden feeling of intense body heat. Your head, neck, and chest may get red. Your heartbeat may speed up, and you may feel anxious. You may find that hot flashes occur more often in warm rooms or during stressful times. Hot flashes and other symptoms are a normal response to the hormone changes that occur before your menstrual cycle goes away completely (menopause). Hot flashes often get better and go away with time. Some women take hormone pills or other medicine to treat bothersome symptoms. Follow-up care is a key part of your treatment and safety.  Be sure to make and go to all appointments, and call your doctor if you are having problems. It's also a good idea to know your test results and keep a list of the medicines you take. How can you care for yourself at home? · If you decide to take medicine to treat hot flashes, take it exactly as prescribed. Call your doctor if you think you are having a problem with your medicine. You will get more details on the specific medicine your doctor prescribes. · Learn to meditate. Sit quietly and focus on your breathing. Try to practice each day. Books, classes, and tapes can help you start a program.  · Wear natural fabrics, such as cotton and silk. Dress in layers so you can take off clothes as needed. · Keep the room temperature cool, or use a fan. You are more likely to have a hot flash when you are too warm than when you are cool. · Use fewer blankets when you sleep at night. · Drink cold fluids rather than hot ones. Limit your intake of caffeine and alcohol. · Eat smaller meals more often during the day so your body makes less heat than when digesting large amounts of food. Eat low-fat and high-fiber foods. · Do not smoke. Smoking can make hot flashes worse. If you need help quitting, talk to your doctor about stop-smoking programs and medicines. These can increase your chances of quitting for good. · Get at least 30 minutes of exercise on most days of the week. Walking is a good choice. You also may want to do other activities, such as running, swimming, cycling, or playing tennis or team sports. Where can you learn more? Go to https://penelope.[employer]. org and sign in to your "" account. Enter F700 in the [facility] box to learn more about \"Hot Flashes During Menopause: Care Instructions. \"     If you do not have an account, please click on the \"Sign Up Now\" link. Current as of: February 11, 2021               Content Version: 12.9  © 8811-1351 Healthwise, [facility].    Care instructions adapted under license by [facility] ([facility]). If you have questions about a medical condition or this instruction, always ask your healthcare professional. [address] any warranty or liability for your use of this information. Patient Education        Atrophic Vaginitis: Care Instructions  Your Care Instructions     Atrophic vaginitis is an irritation of the vagina. It's caused by thinning tissues and less moisture in the vaginal walls. It often happens during menopause when hormone levels change. Surgery to remove the ovaries also can cause it. Your doctor may do tests to rule out other causes. And you may get tests to measure your hormone levels. The problem is most often treated with the hormone estrogen. It comes in a cream, tablets, or a soft plastic ring that is placed in the vagina. Follow-up care is a key part of your treatment and safety. Be sure to make and go to all appointments, and call your doctor if you are having problems. It's also a good idea to know your test results and keep a list of the medicines you take. How can you care for yourself at home? · Use a water-based lubricant for your vagina if sex is dry or painful. Examples are Astroglide, Wet Lubricant Gel, and K-Y Jelly. · Talk with your doctor about using low-dose vaginal estrogen. It treats dryness and thinning tissue. · Do not douche. · Having sex improves blood flow to the vagina. This helps keep your tissue healthy. When should you call for help? Watch closely for changes in your health, and be sure to contact your doctor if:    · You have unexpected vaginal bleeding.     · You do not get better as expected. Where can you learn more? Go to https://Sonar[employer]alka.[employer]. org and sign in to your [employer] account. Enter R330 in the [employer] box to learn more about \"Atrophic Vaginitis: Care Instructions. \"     If you do not have an account, please click on the \"Sign Up Now\" link. Current as of: February 11, 2021               Content Version: 12.9  © 1889-0560 Healthwise, Inc[employer]. Care instructions adapted under license by [facility] ([facility]). If you have questions about a medical condition or this instruction, always ask your healthcare professional. [address] any warranty or liability for your use of this information.

## 2021-07-08 NOTE — PROGRESS NOTES
7/8/2021    This is a 44 y.o. female who presents for  Chief Complaint   Patient presents with   [facility] Gynecologic Exam     pt has some concerns, menopausal symptoms, dryness       HPI:     Patient's last menstrual period was 06/23/2019. Denies any vaginal discharge, new pelvic pain, AUB  + dryness, dyspareunia   + hot flashes. Used to have 12 a day, now has 5 a day  No new sexual partners  Defers any STD testing  No new family hx changes of uterine, ovarian Ca    Breast health:   Denies any new lumps or bumps, skin changes, nipple drainage, new tenderness  Family history updated  Mammogram 4/29/21, reassuring        Past Medical History:   Diagnosis Date    Allergic rhinitis     Anxiety     Esophageal reflux 2/13/2020    Headache     Rash     Recurrent upper respiratory infection (URI)     Spondylosis without myelopathy or radiculopathy, cervical region 11/13/2018    Tinnitus        Past Surgical History:   Procedure Laterality Date    COLONOSCOPY  08/07/2020    NL    COLONOSCOPY N/A 8/7/2020    COLONOSCOPY DIAGNOSTIC performed by Martin Mathur MD at [address] Marital status:      Spouse name: Not on file    Number of children: Not on file    Years of education: Not on file    Highest education level: Not on file   Occupational History    Not on file   Tobacco Use    Smoking status: Never Smoker    Smokeless tobacco: Never Used   Substance and Sexual Activity    Alcohol use: Yes     Comment: occasionally    Drug use: No    Sexual activity: Not on file   Other Topics Concern    Not on file   Social History Narrative    Not on file     Social Determinants of Health     Financial Resource Strain:     Difficulty of Paying Living Expenses:    Food Insecurity:     Worried About Running Out of Food in the Last Year:     [address] in the Last Year:    Transportation Needs:     Lack of Transportation (Medical):      Lack of topically 2 times daily Apply topically 2 times daily.  ketoconazole (NIZORAL) 2 % cream Apply topically daily Apply topically daily. No current facility-administered medications for this visit. Immunization History   Administered Date(s) Administered    COVID-19, Moderna, PF, 100mcg/0.5mL 01/07/2021, 02/04/2021    Influenza Virus Vaccine 10/11/2011, 11/07/2014, 11/01/2016    Influenza, Quadv, IM, PF (6 mo and older Fluzone, Flulaval, Fluarix, and 3 yrs and older Afluria) 10/18/2018, 09/29/2020    Influenza, Koleen Gina, Recombinant, IM PF (Flublok 18 yrs and older) 10/31/2019    Tdap (Boostrix, Adacel) 09/08/2009, 10/31/2019    Zoster Recombinant (Shingrix) 10/31/2019, 11/12/2020       No Known Allergies    Review of Systems   Constitutional: Negative for activity change, chills, fever and unexpected weight change. Respiratory: Negative for shortness of breath. Cardiovascular: Negative for chest pain. Gastrointestinal: Negative for abdominal distention, abdominal pain, constipation, diarrhea, nausea and vomiting. Genitourinary: Positive for vaginal pain. Negative for decreased urine volume, difficulty urinating, dyspareunia, dysuria, flank pain, frequency, genital sores, hematuria, menstrual problem, pelvic pain, urgency, vaginal bleeding and vaginal discharge. Musculoskeletal: Negative for back pain. Skin: Negative for color change and rash. Allergic/Immunologic: Negative for immunocompromised state. Hematological: Negative for adenopathy. /62 (Site: Right Upper Arm, Position: Sitting, Cuff Size: Medium Adult)   Pulse 82   Ht 5' 3.2\" (1.605 m)   Wt 122 lb 9.6 oz (55.6 kg)   LMP 06/23/2019   SpO2 97%   BMI 21.58 kg/m²     Physical Exam  Vitals and nursing note reviewed. Constitutional:       General: She is not in acute distress. Appearance: She is well-developed. She is not diaphoretic. HENT:      Head: Normocephalic and atraumatic.    Eyes:      Pupils: Pupils are equal, round, and reactive to light. Cardiovascular:      Rate and Rhythm: Normal rate and regular rhythm. Pulmonary:      Effort: Pulmonary effort is normal. No respiratory distress. Breath sounds: Normal breath sounds. Abdominal:      General: There is no distension. Palpations: Abdomen is soft. There is no mass. Tenderness: There is no abdominal tenderness. There is no guarding or rebound. Genitourinary:     Labia:         Right: No rash, tenderness, lesion or injury. Left: No rash, tenderness, lesion or injury. Vagina: Normal. No signs of injury and foreign body. No vaginal discharge, erythema, tenderness or bleeding. Cervix: No cervical motion tenderness, discharge or friability. Uterus: Not deviated, not enlarged, not fixed and not tender. Adnexa:         Right: No mass, tenderness or fullness. Left: No mass, tenderness or fullness. Rectum: No external hemorrhoid. Musculoskeletal:         General: Normal range of motion. Cervical back: Normal range of motion and neck supple. Skin:     General: Skin is warm and dry. Coloration: Skin is not pale. Findings: No erythema or rash. Neurological:      Mental Status: She is alert. Psychiatric:         Behavior: Behavior normal.         Thought Content: Thought content normal.         Judgment: Judgment normal.         Plan  1. Cervical cancer screening  - PAP SMEAR  - HUMAN PAPILLOMAVIRUS (HPV) DNA PROBE THIN PREP HIGH RISK    2. Screening for HPV (human papillomavirus)  - HUMAN PAPILLOMAVIRUS (HPV) DNA PROBE THIN PREP HIGH RISK    3. Vaginal dryness  - conjugated estrogens (PREMARIN) 0.625 MG/GM vaginal cream; Apply 0.5 grams twice weekly (Monday/Thursday)  Dispense: 1 Tube; Refill: 3    4. Hot flashes  Discussed lifestyle modifications, roles of HRT vs SSRIs for medication options. Will defer for now.  Discouraged oral HRT at this time         While assessing care for this patient, I have reviewed all pertinent lab work/imaging/ specialist notes and care in reference to those problems addressed above in detail. Appropriate medical decision making was based on this. Please note that portions of this note may have been completed with a voice recognition program. Efforts were made to edit the dictations but occasionally words are mis-transcribed. Return if symptoms worsen or fail to improve.

## 2021-07-12 LAB
HPV COMMENT: NORMAL
HPV TYPE 16: NOT DETECTED
HPV TYPE 18: NOT DETECTED
HPVOH (OTHER TYPES): NOT DETECTED

## 2021-10-19 NOTE — PROGRESS NOTES
Smooth Westbrook   1970, 46 y.o. female   [id number]       Referring Provider: Yusuf Huynh MD  Referral Type: In an order in [address] [place] [address]    Reason for Visit: Evaluation of suspected change in hearing and tinnitus    ADULT AUDIOLOGIC EVALUATION      Smooth Westbrook is a 46 y.o. female seen today, 10/20/2021 , for a recheck audiologic evaluation. Patient was seen by uYsuf Huynh MD following today's evaluation. Previous evaluation from 02/10/2020 viewable in medical record. AUDIOLOGIC AND OTHER PERTINENT MEDICAL HISTORY:      Smooth Westbrook noted possible change in hearing and tinnitus since previous evaluation; however, notes clogged sensation of left ear has improved. No additional changes to otologic or medical history were reported. Smooth Westbrook denied otalgia, otorrhea, dizziness, history of falls, history of occupational/recreational noise exposure, history of head trauma, history of ear surgery and family history of hearing loss. Date: 10/20/2021     IMPRESSIONS:      Today's results revealed stable hearing compared to previous evaluation. Discussed use of tinnitus management strategies. Follow medical recommendations of Yusuf Huynh MD.     ASSESSMENT AND FINDINGS:     Otoscopy revealed: Clear ear canals bilaterally    RIGHT EAR:  Hearing Sensitivity:Hearing sensitivity within normal limits from 250-8000 Hz  Speech Recognition Threshold: 0 dB HL  Word Recognition: Excellent (100%), based on NU-6 25-word list at 45 dBHL  using recorded speech stimuli. Tympanometry: Normal peak pressure and compliance, Type A tympanogram, consistent with normal middle ear function. LEFT EAR:  Hearing Sensitivity:Within normal limits through 4kHz sloping to a mild hearing loss from 6-8kHz. Speech Recognition Threshold: 5 dB HL  Word Recognition: Excellent (100%), based on NU-6 25-word list at 45 dBHL using recorded speech stimuli.     Tympanometry: Normal peak pressure and compliance, Type A tympanogram, consistent with normal middle ear function. Reliability: Good   Transducer: Inserts    See scanned audiogram dated 10/20/2021  for results. PATIENT EDUCATION:       The following items were discussed with the patient:   - Good Communication Strategies  - Tinnitus Management Strategies      Educational information was shared in the After Visit Summary. RECOMMENDATIONS:                                                                                                                                                                                                                                                            The following items are recommended based on patient report and results from today's appointment:   - Continue medical follow-up with Padma Greenberg MD.   - Retest hearing as medically indicated and/or sooner if a change in hearing is noted. - Utilize \"Good Communication Strategies\" as discussed to assist in speech understanding with communication partners. - Maintain a sound enriched environment to assist in the management of tinnitus symptoms.        Madiha Ramey [facility]  Audiologist    Chart CC'd to: Padma Greenberg MD      Degree of   Hearing Sensitivity dB Range   Within Normal Limits (WNL) 0 - 20   Mild 20 - 40   Moderate 40 - 55   Moderately-Severe 55 - 70   Severe 70 - 90   Profound 90 +

## 2021-10-20 ENCOUNTER — OFFICE VISIT (OUTPATIENT)
Dept: ENT CLINIC | Age: 51
End: 2021-10-20
Payer: COMMERCIAL

## 2021-10-20 ENCOUNTER — PROCEDURE VISIT (OUTPATIENT)
Dept: AUDIOLOGY | Age: 51
End: 2021-10-20
Payer: COMMERCIAL

## 2021-10-20 VITALS — TEMPERATURE: 97.3 F | DIASTOLIC BLOOD PRESSURE: 64 MMHG | HEART RATE: 66 BPM | SYSTOLIC BLOOD PRESSURE: 104 MMHG

## 2021-10-20 DIAGNOSIS — H91.8X2 OTHER HEARING LOSS OF LEFT EAR WITH UNRESTRICTED HEARING OF RIGHT EAR: Primary | ICD-10-CM

## 2021-10-20 DIAGNOSIS — H93.12 TINNITUS, LEFT EAR: ICD-10-CM

## 2021-10-20 DIAGNOSIS — H93.19 TINNITUS, UNSPECIFIED LATERALITY: Primary | ICD-10-CM

## 2021-10-20 DIAGNOSIS — H61.23 BILATERAL IMPACTED CERUMEN: ICD-10-CM

## 2021-10-20 DIAGNOSIS — L40.9 PSORIASIS: ICD-10-CM

## 2021-10-20 PROCEDURE — 92557 COMPREHENSIVE HEARING TEST: CPT | Performed by: AUDIOLOGIST

## 2021-10-20 PROCEDURE — 99214 OFFICE O/P EST MOD 30 MIN: CPT | Performed by: OTOLARYNGOLOGY

## 2021-10-20 PROCEDURE — 69210 REMOVE IMPACTED EAR WAX UNI: CPT | Performed by: OTOLARYNGOLOGY

## 2021-10-20 PROCEDURE — 92567 TYMPANOMETRY: CPT | Performed by: AUDIOLOGIST

## 2021-10-20 RX ORDER — FLUOCINOLONE ACETONIDE 0.11 MG/ML
5 OIL AURICULAR (OTIC) 2 TIMES DAILY
Qty: 20 ML | Refills: 1 | Status: SHIPPED | OUTPATIENT
Start: 2021-10-20 | End: 2021-10-27

## 2021-10-20 NOTE — PROGRESS NOTES
[address] UP VISIT      Patient Name: Yoana Silveira  Medical Record Number:  [id number]  Primary Care Physician:  Drea Herrera MD    ChiefComplaint     Chief Complaint   Patient presents with    Follow-up     States that she is here for a yearly visit to check her hearing. She also has some wax in left ear. History of Present Illness     Yoana Silveira is an 46 y.o. female with ringing in the left ear since her 25s (had an ear infection, following this had tinnitus in the left ear), feels aural fullness and worsened tinnitus in the left ear. States she had an upper respiratory infection and congestion at the beginning of the year; she was treated with two courses of antibiotics however left aural fullness has not resolved. Denies otalgia, otorrhea, vertigo, no history of ear trauma or surgery. No history of chronic ear disease a sa child,no FHx of early hearing loss. Audiometric testing shows normal hearing loss bilaterally, however left type B tympanogram on immitance testing. States history of seasonal allergies, takes fluticasone, cetirizine year-round. This provides relief of head congestion - denies history of chronic rhinosinusitis (no facial pain, no purulent/green rhinorrhea, no anosmia/hyposmia)    Interval History 10/20/2021: Continued left ear tinnitus, high pitched, minimally intrusive (uses masking at night). No otorrhea, otalgia, ear fullness, vertigo. No recent ear infections. Managing ear wax with H2O2 drops. Of interest, patient has psoriasis of the bilateral ears and scalp, with occasional flare ups with pruritus. Patient previously with left ear fullness, however has resolved with ear autoinsufflation and flonase (occurred 2-3 weeks after last clinic visit).      Past Medical History     Past Medical History:   Diagnosis Date    Allergic rhinitis     Anxiety     Esophageal reflux 2/13/2020    Headache  Rash     Recurrent upper respiratory infection (URI)     Spondylosis without myelopathy or radiculopathy, cervical region 11/13/2018    Tinnitus        Past Surgical History     Past Surgical History:   Procedure Laterality Date    COLONOSCOPY  08/07/2020    NL    COLONOSCOPY N/A 8/7/2020    COLONOSCOPY DIAGNOSTIC performed by Bradley Gomez MD at [email or web address] History     Family History   Problem Relation Age of Onset    No Known Problems Mother     No Known Problems Father     Diabetes Maternal Grandfather     Diabetes Paternal Grandmother        Social History     Social History     Tobacco Use    Smoking status: Never Smoker    Smokeless tobacco: Never Used   Substance Use Topics    Alcohol use: Yes     Comment: occasionally    Drug use: No        Allergies     No Known Allergies    Medications     Current Outpatient Medications   Medication Sig Dispense Refill    fluocinolone (DERMOTIC) 0.01 % OIL oil Place 5 drops in ear(s) 2 times daily for 7 days 20 mL 1    Cholecalciferol (VITAMIN D) 50 MCG (2000 UT) CAPS capsule Take 1 capsule by mouth daily      conjugated estrogens (PREMARIN) 0.625 MG/GM vaginal cream Apply 0.5 grams twice weekly (Monday/Thursday) 1 Tube 3    fluticasone (FLONASE) 50 MCG/ACT nasal spray USE 1 SPRAY IN EACH NOSTRIL TWO TIMES A DAY 1 Bottle 5    valACYclovir (VALTREX) 1 g tablet Take 2 tablets by mouth daily 90 tablet 0    hydrOXYzine (ATARAX) 25 MG tablet Take 12.5-25 mg by mouth      cetirizine (ZYRTEC) 10 MG tablet Take 10 mg by mouth daily      SUMAtriptan (IMITREX) 100 MG tablet Take 100 mg by mouth as needed for Migraine      Famotidine (PEPCID PO) Take by mouth as needed      Multiple Vitamin (MULTI-VITAMIN DAILY PO) Take by mouth daily      CALCIUM PO Take by mouth daily      Omega-3 Fatty Acids (FISH OIL PO) Take by mouth daily      ketoconazole (NIZORAL) 2 % shampoo Apply topically daily as needed for Itching (psoriasis) Apply topically daily as needed.  fluocinonide (LIDEX) 0.05 % external solution Apply topically 2 times daily Apply topically 2 times daily.  desonide (DESOWEN) 0.05 % cream Apply topically 2 times daily Apply topically 2 times daily.  ketoconazole (NIZORAL) 2 % cream Apply topically daily Apply topically daily. No current facility-administered medications for this visit. Review of Systems     REVIEW OF SYSTEMS  The following systems were reviewed and revealed the following in addition to any already discussed in the HPI:    CONSTITUTIONAL: No weight loss, no fever, no night sweats, no chills  EYES: no vision changes, no blurry vision  EARS: no changes in hearing, no otalgia  NOSE: no epistaxis, no rhinorrhea  RESPIRATORY: No difficulty breathing, no shortness of breath  CV: no chest pain, no peripheral vascular disease  HEME: No coagulation disorder, no bleeding disorder  NEURO: No TIA or stroke-like symptoms  SKIN: No new rashes in the head and neck (has history of psoriasis of the head and neck), no recent skin cancers  MOUTH: No no new ulcers, no recent teeth infections  GASTROINTESTINAL: No diarrhea, stomach pain  PSYCH: No anxiety, no depression    PhysicalExam     Vitals:    10/20/21 0857   BP: 104/64   Site: Left Upper Arm   Position: Sitting   Cuff Size: Medium Adult   Pulse: 66   Temp: 97.3 °F (36.3 °C)       PHYSICAL EXAM  /64 (Site: Left Upper Arm, Position: Sitting, Cuff Size: Medium Adult)   Pulse 66   Temp 97.3 °F (36.3 °C)   LMP 06/23/2019     GENERAL: No Acute Distress, Alert and Oriented, no hoarseness  EYES: EOMI, Anti-icteric  NOSE: On anterior rhinoscopy there is no epistaxis, nasal mucosa within normal limits, no purulent drainage  EARS: Normal external appearance; there is silver scaling over the right postauricular skin consistent with psoriasis.   On portable otomicroscopy:  -Right ear: External auditory canal without stenosis, tympanic membrane obscured by cerumen  -Left ear: External auditory canal without stenosis, tympanic membrane obscured by cerumen  FACE: 1/6 House-Brackmann Scale, symmetric, sensation equal bilaterally  ORAL CAVITY: No masses or lesions palpated, uvula is midline, moist mucous membranes, 0+ tonsils, good dentition  NECK: Normal range of motion, no thyromegaly, trachea is midline, no lymphadenopathy, no neck masses, no crepitus  CHEST: Normal respiratory effort, no retractions, breathing comfortably  SKIN: No rashes, normal appearing skin, no evidence of skin lesions/tumors  NEURO: CN 2, 3, 4, 5, 6, 7, 11, 12 intact bilaterally     Procedure     Binocular Otomicroscopy     Pre op: Cerumen impaction bilateral  Post op: Normal ear examination     Procedure : Binocular otomicroscopy  Surgeon: EZEQUIEL Perry  Estimated Blood Loss: None    After obtaining consent, the patient was placed in the examination chair in the reclined position.      -Right ear: External auditory canal with cerumen removed with suction and wax curette, tympanic membrane showing no perforations/retractions, middle ear clear  -Left ear: External auditory canal with cerumen removed with suction and wax curette, tympanic membrane showing no perforations/retractions, middle ear clear   * Patient tolerated the procedure well with no complications           Assessment and Plan      Diagnosis Orders   1. Tinnitus, unspecified laterality  Wayne Snyder, Audiology, [facility]   2. Psoriasis  fluocinolone (DERMOTIC) 0.01 % OIL oil   3. Bilateral impacted cerumen       42-year-old female with left ear tinnitus since childhood following an ear infection. This is high-pitched in nature, constant, unchanged.   At last visit she had asymmetries in the left ear which appear to have improved, along with retraction of the left tympanic membrane and type B tympanogram-this has resolved, uncertain if this was a singular incidence of retraction or represents dilatory eustachian tube dysfunction. She also psoriasis of the scalp and ears, with occasional pruritus and inflammation of the external auditory canal.  We will prescribe her Derm otic for this on an as-needed basis. Finally, she has issues with cerumen which she manages with hydrogen peroxide drops after you have encouraged her to continue this, and have debrided cerumen from the ears today under binocular otomicroscopy. We will have her follow-up in 1 year with audiometric testing      Return in 1 year (on 10/20/2022). Dilcia Zafar MD  [address] [place] [address],[address]  Department of Otolaryngology/Head and Neck Surgery  10/20/21    I have performed a head and neck physical exam personally or was physically present during the key or critical portions of the service. Medical Decision Making: The following items were considered in medical decision making:  Independent review of images  Review / order clinical lab tests  Review / order radiology tests  Decision to obtain old records  Review and summation of old records as accessed through [employer] (a summary of my findings in these old records: reviewed records from 1/8/2020 from [address] [place] [address])     Portions of this note were dictated using Dragon.  There may be linguistic errors secondary to the use of this program.

## 2021-10-20 NOTE — PATIENT INSTRUCTIONS
Good Communication Strategies    Communication can be challenging for anyone, but can be especially difficult for those with some degree of hearing loss. While we may not be able to control every factor that may lead to difficulty with communication, there are Good Communication Strategies that we can all use in our day-to-day lives. Communication takes both parties working together for it to be successful. Tips as a Listener:   1. Control your environment. It is important to limit the amount of background noise in the room when possible. You should also consider having a good light source in the room to best see the other person. 2. Ask for clarification. Instead of saying \"What?\", you can use parts of what you heard to make a new question. For example, if you heard the word \"Thursday\" but not the rest of the week, you may ask \"What was that about Thursday? \" or \"What did you want to do Thursday? \". This shows the person talking that you are listening and will help them better explain what they are saying. 3. Be an advocate for yourself. If you are hearing but not understanding, tell the other person \"I can hear you, but I need you to slow down when you speak. \"  Or if someone is facing the other direction, say \"I cannot hear you when you are not looking at me when we talk. \"       Tips as a Talker:   - Sit or stand 3 to 6 feet away to maximize audibility         -- It is unrealistic to believe someone else will fully hear your message if you are speaking from across the room or in a different room in the house   - Stay at eye level to help with visual cues   - Make sure you have the persons attention before speaking   - Use facial expressions and gestures to accentuate your message   - Raise your voice slightly (do not scream)   - Speak slowly and distinctly   - Use short, simple sentences   - Rephrase your words if the person is having a hard time understanding you    - To avoid distortion, dont speak directly into a persons ear      Some additional items that may be helpful:   - Remain patient - this is important for both parties   - Write down items that still cannot be heard/understood. You may write with pen/paper or consider typing/texting on a cell phone or smart device. - If background noise is unavoidable, try to keep yourself in a good position in the room. By sitting at a shell on the side of the restaurant (preferably a corner), it will be easier to communicate than if you were sitting at a table in the middle with background noise surrounding you. Keep yourself positioned away from music speakers or heavy foot traffic. Tinnitus: Overview and Management Strategies          Many people have some ringing sounds in their ears once in a while. You may hear a roar, a hiss, a tinkle, or a buzz. The sound usually lasts only a few minutes. If it goes on all the time, you may have tinnitus. Tinnitus is usually caused by long-term exposure to loud noise. This damages the nerves in the inner ear. It can occur with all types of hearing loss. It may be a symptom of almost any ear problem. Tinnitus may be caused by a buildup of earwax. Or, it may be caused by ear infections or certain medicines (especially antibiotics or large amounts of aspirin). You can also hear noises in your ears because of an injury to the ears, drinking too much alcohol or caffeine, or a medical condition. Other conditions may also contribute to tinnitus, including: head and neck trauma, temporomandibular joint disorder (TMJ), sinus pressure and barometric trauma, traumatic brain injury, metabolic disorders, autoimmune disorders, stress, and high blood pressure. You may need tests to evaluate your hearing and to find causes of long-lasting tinnitus. Your doctor may suggest one or more treatments to help you cope with the tinnitus. You can also do things at home to help reduce symptoms.     Follow-up care is a key part of your treatment and safety. Be sure to make and go to all appointments, and call your doctor if you are having problems. It's also a good idea to know your test results and keep a list of the medicines you take. How can you care for yourself at home? · Limit or cut out alcohol, caffeine, and sodium. They can make your symptoms worse. · Do not smoke or use other tobacco products. Nicotine reduces blood flow to the ear and makes tinnitus worse. If you need help quitting, talk to your doctor about stop-smoking programs and medicines. These can increase your chances of quitting for good. · Talk to your doctor about whether to stop taking aspirin and similar products such as ibuprofen or naproxen. · Get exercise often. It can help improve blood flow to the ear. Ways to manage/cope with tinnitus  Some tinnitus may last a long time. To manage your tinnitus, try to:  · Avoid noises that you think caused your tinnitus. If you can't avoid loud noises, wear earplugs or earmuffs. · Ignore the sound by paying attention to other things. Keeping your brain busy with other tasks or background noise can help your brain not focus on the tinnitus. · Try to not give the tinnitus an emotional reaction. Do your best to ignore the sound and not let it bother you. Relax using biofeedback, meditation, or yoga. Feeling stressed and being tired can make tinnitus worse. · Play music or white noise to help you sleep. Background noise may cover up the noise that you hear in your ears. You can buy a tabletop machine or a device that sits under your pillow to play soothing sounds, like ocean waves. · Smart phones have free apps, such as Whist, Relax Melodies, ReSound Relief, and White Noise Lite. These apps have different types of sounds/noise, some of which you can blend together to find sounds that are most soothing to you.   · Hearing aid technology, especially when there is some hearing loss, may help reduce tinnitus symptoms by giving your brain better access to the sounds it is missing. There are some hearing aids with built-in noise generator programs, which may help when amplification alone is not enough. Additional resources may be found through the [employer] at www.elmer.org    When should you call for help? Call 911 anytime you think you may need emergency care. For example, call if:    · You have symptoms of a stroke. These may include:  ? Sudden numbness, tingling, weakness, or loss of movement in your face, arm, or leg, especially on only one side of your body. ? Sudden vision changes. ? Sudden trouble speaking. ? Sudden confusion or trouble understanding simple statements. ? Sudden problems with walking or balance. ? A sudden, severe headache that is different from past headaches. Call your doctor now or seek immediate medical care if:    · You develop other symptoms. These may include hearing loss (or worse hearing loss), balance problems, dizziness, nausea, or vomiting. Watch closely for changes in your health, and be sure to contact your doctor if:    · Your tinnitus moves from both ears to one ear. · Your hearing loss gets worse within 1 day after an ear injury. · Your tinnitus or hearing loss does not get better within 1 week after an ear injury. · Your tinnitus bothers you enough that you want to take medicines to help you cope with it. If you notice changes in your tinnitus and/or your hearing, it is recommended that you have your hearing tested by your audiologist and to follow-up with your physician that manages your hearing loss (such as your ENT or Primary Care doctor).

## 2021-10-20 NOTE — PATIENT INSTRUCTIONS
-Continue hydrogen peroxide drops for cerumen   -May use fluocinolone oil to the ears with flare-ups of psoriasis

## 2021-10-20 NOTE — Clinical Note
Dr. Edwina Rome,    Thank you for your referral for audiometric testing on this patient. Please see the scanned audiogram (under \"Media\" tab) and encounter note for details. If you have any questions, or if there is anything else you need, please let me know.       Merary Muñoz  Audiologist  ---  [address] - Audiology

## 2021-11-03 ENCOUNTER — OFFICE VISIT (OUTPATIENT)
Dept: FAMILY MEDICINE CLINIC | Age: 51
End: 2021-11-03
Payer: COMMERCIAL

## 2021-11-03 VITALS
SYSTOLIC BLOOD PRESSURE: 100 MMHG | DIASTOLIC BLOOD PRESSURE: 60 MMHG | HEART RATE: 61 BPM | BODY MASS INDEX: 21.68 KG/M2 | OXYGEN SATURATION: 94 % | HEIGHT: 64 IN | WEIGHT: 127 LBS

## 2021-11-03 DIAGNOSIS — Z00.00 ANNUAL PHYSICAL EXAM: Primary | ICD-10-CM

## 2021-11-03 DIAGNOSIS — Z23 INFLUENZA VACCINE NEEDED: ICD-10-CM

## 2021-11-03 DIAGNOSIS — A60.00 GENITAL HERPES SIMPLEX, UNSPECIFIED SITE: ICD-10-CM

## 2021-11-03 DIAGNOSIS — J30.2 SEASONAL ALLERGIC RHINITIS, UNSPECIFIED TRIGGER: ICD-10-CM

## 2021-11-03 DIAGNOSIS — G43.909 MIGRAINE WITHOUT STATUS MIGRAINOSUS, NOT INTRACTABLE, UNSPECIFIED MIGRAINE TYPE: ICD-10-CM

## 2021-11-03 DIAGNOSIS — F40.243 FEAR OF FLYING: ICD-10-CM

## 2021-11-03 LAB
A/G RATIO: 2.1 (ref 1.1–2.2)
ALBUMIN SERPL-MCNC: 4.9 G/DL (ref 3.4–5)
ALP BLD-CCNC: 82 U/L (ref 40–129)
ALT SERPL-CCNC: 26 U/L (ref 10–40)
ANION GAP SERPL CALCULATED.3IONS-SCNC: 13 MMOL/L (ref 3–16)
AST SERPL-CCNC: 25 U/L (ref 15–37)
BASOPHILS ABSOLUTE: 0 K/UL (ref 0–0.2)
BASOPHILS RELATIVE PERCENT: 0.8 %
BILIRUB SERPL-MCNC: 0.6 MG/DL (ref 0–1)
BUN BLDV-MCNC: 15 MG/DL (ref 7–20)
CALCIUM SERPL-MCNC: 9.7 MG/DL (ref 8.3–10.6)
CHLORIDE BLD-SCNC: 102 MMOL/L (ref 99–110)
CHOLESTEROL, TOTAL: 228 MG/DL (ref 0–199)
CO2: 25 MMOL/L (ref 21–32)
CREAT SERPL-MCNC: 0.7 MG/DL (ref 0.6–1.1)
EOSINOPHILS ABSOLUTE: 0.1 K/UL (ref 0–0.6)
EOSINOPHILS RELATIVE PERCENT: 1.3 %
GFR AFRICAN AMERICAN: >60
GFR NON-AFRICAN AMERICAN: >60
GLUCOSE BLD-MCNC: 75 MG/DL (ref 70–99)
HCT VFR BLD CALC: 41.9 % (ref 36–48)
HDLC SERPL-MCNC: 99 MG/DL (ref 40–60)
HEMOGLOBIN: 14 G/DL (ref 12–16)
LDL CHOLESTEROL CALCULATED: 120 MG/DL
LYMPHOCYTES ABSOLUTE: 1.6 K/UL (ref 1–5.1)
LYMPHOCYTES RELATIVE PERCENT: 29.2 %
MCH RBC QN AUTO: 34.2 PG (ref 26–34)
MCHC RBC AUTO-ENTMCNC: 33.6 G/DL (ref 31–36)
MCV RBC AUTO: 101.7 FL (ref 80–100)
MONOCYTES ABSOLUTE: 0.3 K/UL (ref 0–1.3)
MONOCYTES RELATIVE PERCENT: 6.4 %
NEUTROPHILS ABSOLUTE: 3.3 K/UL (ref 1.7–7.7)
NEUTROPHILS RELATIVE PERCENT: 62.3 %
PDW BLD-RTO: 13.3 % (ref 12.4–15.4)
PLATELET # BLD: 237 K/UL (ref 135–450)
PMV BLD AUTO: 8.9 FL (ref 5–10.5)
POTASSIUM REFLEX MAGNESIUM: 4.7 MMOL/L (ref 3.5–5.1)
RBC # BLD: 4.11 M/UL (ref 4–5.2)
SODIUM BLD-SCNC: 140 MMOL/L (ref 136–145)
T4 FREE: 0.9 NG/DL (ref 0.9–1.8)
TOTAL PROTEIN: 7.2 G/DL (ref 6.4–8.2)
TRIGL SERPL-MCNC: 44 MG/DL (ref 0–150)
TSH SERPL DL<=0.05 MIU/L-ACNC: 2.4 UIU/ML (ref 0.27–4.2)
VITAMIN D 25-HYDROXY: 67.6 NG/ML
VLDLC SERPL CALC-MCNC: 9 MG/DL
WBC # BLD: 5.4 K/UL (ref 4–11)

## 2021-11-03 PROCEDURE — 90471 IMMUNIZATION ADMIN: CPT | Performed by: NURSE PRACTITIONER

## 2021-11-03 PROCEDURE — 90674 CCIIV4 VAC NO PRSV 0.5 ML IM: CPT | Performed by: NURSE PRACTITIONER

## 2021-11-03 PROCEDURE — 99396 PREV VISIT EST AGE 40-64: CPT | Performed by: NURSE PRACTITIONER

## 2021-11-03 RX ORDER — FLUTICASONE PROPIONATE 50 MCG
SPRAY, SUSPENSION (ML) NASAL
Qty: 1 EACH | Refills: 2 | Status: SHIPPED | OUTPATIENT
Start: 2021-11-03

## 2021-11-03 RX ORDER — SUMATRIPTAN 100 MG/1
100 TABLET, FILM COATED ORAL PRN
Qty: 9 TABLET | Refills: 0 | Status: SHIPPED | OUTPATIENT
Start: 2021-11-03

## 2021-11-03 RX ORDER — LORAZEPAM 0.5 MG/1
TABLET ORAL
Qty: 10 TABLET | Refills: 0 | Status: SHIPPED | OUTPATIENT
Start: 2021-11-03 | End: 2022-11-04 | Stop reason: SDUPTHER

## 2021-11-03 RX ORDER — VALACYCLOVIR HYDROCHLORIDE 1 G/1
1000 TABLET, FILM COATED ORAL DAILY
Qty: 5 TABLET | Refills: 2 | Status: SHIPPED | OUTPATIENT
Start: 2021-11-03 | End: 2021-11-08

## 2021-11-03 SDOH — ECONOMIC STABILITY: TRANSPORTATION INSECURITY
IN THE PAST 12 MONTHS, HAS THE LACK OF TRANSPORTATION KEPT YOU FROM MEDICAL APPOINTMENTS OR FROM GETTING MEDICATIONS?: NO

## 2021-11-03 SDOH — ECONOMIC STABILITY: FOOD INSECURITY: WITHIN THE PAST 12 MONTHS, THE FOOD YOU BOUGHT JUST DIDN'T LAST AND YOU DIDN'T HAVE MONEY TO GET MORE.: NEVER TRUE

## 2021-11-03 SDOH — ECONOMIC STABILITY: FOOD INSECURITY: WITHIN THE PAST 12 MONTHS, YOU WORRIED THAT YOUR FOOD WOULD RUN OUT BEFORE YOU GOT MONEY TO BUY MORE.: NEVER TRUE

## 2021-11-03 SDOH — ECONOMIC STABILITY: TRANSPORTATION INSECURITY
IN THE PAST 12 MONTHS, HAS LACK OF TRANSPORTATION KEPT YOU FROM MEETINGS, WORK, OR FROM GETTING THINGS NEEDED FOR DAILY LIVING?: NO

## 2021-11-03 ASSESSMENT — SOCIAL DETERMINANTS OF HEALTH (SDOH): HOW HARD IS IT FOR YOU TO PAY FOR THE VERY BASICS LIKE FOOD, HOUSING, MEDICAL CARE, AND HEATING?: NOT HARD AT ALL

## 2021-11-03 ASSESSMENT — PATIENT HEALTH QUESTIONNAIRE - PHQ9
1. LITTLE INTEREST OR PLEASURE IN DOING THINGS: 0
SUM OF ALL RESPONSES TO PHQ QUESTIONS 1-9: 0
2. FEELING DOWN, DEPRESSED OR HOPELESS: 0
SUM OF ALL RESPONSES TO PHQ QUESTIONS 1-9: 0
SUM OF ALL RESPONSES TO PHQ QUESTIONS 1-9: 0
SUM OF ALL RESPONSES TO PHQ9 QUESTIONS 1 & 2: 0

## 2021-11-03 ASSESSMENT — ENCOUNTER SYMPTOMS
GASTROINTESTINAL NEGATIVE: 1
RESPIRATORY NEGATIVE: 1
EYES NEGATIVE: 1

## 2021-11-03 NOTE — PROGRESS NOTES
11/3/2021    Yanna Olson (:  1970) is a 46 y.o. female, here for a preventive medicine evaluation. Robb Vargas is here today for her routine annual physical.  She is fasting today. Mammogram done 2021 - negative for malignancy. Colonoscopy done 2020 - normal, f/u in 10 years. Papsmear done 10/4/2021 - normal.  LNMP was in 2019. Due for flu vaccine. H/o seasonal allergies, takes Flonase which helps. Requests a refill. H/o genital herpes, takes Valtrex. Requests a refill. H/o migraines, take Imitrex as needed. She states that she only has maybe 2 migraines a year. Pt states that she travels for work and has a fear of flying. Has taken lorazepam in the past which helped. States that she was told by her previous PCP that she could not prescribe lorazepam on a regular basis and recommended that she try hydroxyzine. Pt states that she has tried the hydroxyzine, but it only makes her feel very sleepy, and does not help with her anxiety. Patient Active Problem List   Diagnosis    Tympanic membrane retraction, left    Type b tympanogram, left    Allergic rhinitis    Carpal tunnel syndrome, bilateral upper limbs    Chronic bilateral low back pain with bilateral sciatica    Chronic neck pain    Closed dislocation of tarsal joint    Congenital talipes valgus    Esophageal reflux    Fear of flying    Migraine without status migrainosus, not intractable    Peripheral neuropathy, idiopathic    Psoriasis    Situational anxiety    Spondylosis without myelopathy or radiculopathy, cervical region    Spondylosis without myelopathy or radiculopathy, lumbar region    Genital herpes simplex    Asymptomatic microscopic hematuria    Hot flashes due to menopause    Pronation of both feet       Review of Systems   Constitutional: Negative. HENT: Negative. Eyes: Negative. Respiratory: Negative. Cardiovascular: Negative. Gastrointestinal: Negative. Genitourinary: Negative. Musculoskeletal: Negative. Skin: Negative. Neurological: Negative. Psychiatric/Behavioral: Negative. Prior to Visit Medications    Medication Sig Taking? Authorizing Provider   LORazepam (ATIVAN) 0.5 MG tablet Take one tablet by mouth as needed prior to flying. Yes Larraine Cheek, APRN - CNP   valACYclovir (VALTREX) 1 g tablet Take 1 tablet by mouth daily for 5 days Yes Larraine Cheek, APRN - CNP   fluticasone (FLONASE) 50 MCG/ACT nasal spray USE 1 SPRAY IN EACH NOSTRIL TWO TIMES A DAY Yes Larraine Cheek, APRN - CNP   SUMAtriptan (IMITREX) 100 MG tablet Take 1 tablet by mouth as needed for Migraine Yes Larraine Cheek, APRN - CNP   Cholecalciferol (VITAMIN D) 50 MCG (2000 UT) CAPS capsule Take 1 capsule by mouth daily Yes Historical Provider, MD   conjugated estrogens (PREMARIN) 0.625 MG/GM vaginal cream Apply 0.5 grams twice weekly (Monday/Thursday) Yes Danyel Gallardo MD   cetirizine (ZYRTEC) 10 MG tablet Take 10 mg by mouth daily Yes Historical Provider, MD   Famotidine (PEPCID PO) Take by mouth as needed Yes Historical Provider, MD   Multiple Vitamin (MULTI-VITAMIN DAILY PO) Take by mouth daily Yes Historical Provider, MD   CALCIUM PO Take by mouth daily Yes Historical Provider, MD   Omega-3 Fatty Acids (FISH OIL PO) Take by mouth daily Yes Historical Provider, MD   ketoconazole (NIZORAL) 2 % shampoo Apply topically daily as needed for Itching (psoriasis) Apply topically daily as needed. Yes Historical Provider, MD   fluocinonide (LIDEX) 0.05 % external solution Apply topically 2 times daily Apply topically 2 times daily. Yes Historical Provider, MD   desonide (DESOWEN) 0.05 % cream Apply topically 2 times daily Apply topically 2 times daily. Yes Historical Provider, MD   ketoconazole (NIZORAL) 2 % cream Apply topically daily Apply topically daily.  Yes Historical Provider, MD        No Known Allergies    Past Medical History:   Diagnosis Date    Allergic rhinitis  Anxiety     Esophageal reflux 2/13/2020    Headache     Rash     Recurrent upper respiratory infection (URI)     Spondylosis without myelopathy or radiculopathy, cervical region 11/13/2018    Tinnitus        Past Surgical History:   Procedure Laterality Date    COLONOSCOPY  08/07/2020    NL    COLONOSCOPY N/A 8/7/2020    COLONOSCOPY DIAGNOSTIC performed by Myriam Jang MD at [id number] Marital status:      Spouse name: Not on file    Number of children: Not on file    Years of education: Not on file    Highest education level: Not on file   Occupational History    Not on file   Tobacco Use    Smoking status: Never Smoker    Smokeless tobacco: Never Used   Substance and Sexual Activity    Alcohol use: Yes     Comment: occasionally    Drug use: No    Sexual activity: Not on file   Other Topics Concern    Not on file   Social History Narrative    Not on file     Social Determinants of Health     Financial Resource Strain: Low Risk     Difficulty of Paying Living Expenses: Not hard at all   Food Insecurity: No Food Insecurity    Worried About [facility] in the Last Year: Never true    Bhargavi of Food in the Last Year: Never true   Transportation Needs: No Transportation Needs    Lack of Transportation (Medical): No    Lack of Transportation (Non-Medical):  No   Physical Activity:     Days of Exercise per Week:     Minutes of Exercise per Session:    Stress:     Feeling of Stress :    Social Connections:     Frequency of Communication with Friends and Family:     Frequency of Social Gatherings with Friends and Family:     Attends Hoahaoism Services:     Active Member of Clubs or Organizations:     Attends Club or Organization Meetings:     Marital Status:    Intimate Partner Violence:     Fear of Current or Ex-Partner:     Emotionally Abused:     Physically Abused:     Sexually Abused:         Family History   Problem Relation Age of Onset    No Known Problems Mother     No Known Problems Father     Diabetes Maternal Grandfather     Diabetes Paternal Grandmother        ADVANCE DIRECTIVE: N, <no information>    Vitals:    11/03/21 0809   BP: 100/60   Site: Right Upper Arm   Position: Sitting   Cuff Size: Medium Adult   Pulse: 61   SpO2: 94%   Weight: 127 lb (57.6 kg)   Height: 5' 3.5\" (1.613 m)     Estimated body mass index is 22.14 kg/m² as calculated from the following:    Height as of this encounter: 5' 3.5\" (1.613 m). Weight as of this encounter: 127 lb (57.6 kg). Physical Exam  Vitals reviewed. Constitutional:       Appearance: Normal appearance. She is well-developed. She is not ill-appearing. HENT:      Head: Normocephalic. Eyes:      General: Lids are normal.   Neck:      Thyroid: No thyroid mass, thyromegaly or thyroid tenderness. Vascular: Normal carotid pulses. No carotid bruit or JVD. Cardiovascular:      Rate and Rhythm: Normal rate and regular rhythm. Pulses: Normal pulses. Heart sounds: Normal heart sounds. No murmur heard. Pulmonary:      Effort: Pulmonary effort is normal.      Breath sounds: Normal breath sounds. Musculoskeletal:         General: Normal range of motion. Cervical back: Full passive range of motion without pain and normal range of motion. No edema or crepitus. No pain with movement. Normal range of motion. Lymphadenopathy:      Cervical: No cervical adenopathy. Right cervical: No superficial cervical adenopathy. Left cervical: No superficial cervical adenopathy. Skin:     General: Skin is warm and dry. Capillary Refill: Capillary refill takes less than 2 seconds. Neurological:      General: No focal deficit present. Mental Status: She is alert and oriented to person, place, and time. Psychiatric:         Mood and Affect: Mood normal.         Behavior: Behavior normal. Behavior is cooperative.          Thought Aged Out    Pneumococcal 0-64 years Vaccine  Aged Out     PDMP Monitoring:    Last PDMP Aureliano Crews as Reviewed [facility]):  Review User Review Instant Review Result   Allan Everardo 11/3/2021  8:39 AM Reviewed PDMP [1]     Last Controlled Substance Monitoring Documentation      Office Visit from 11/3/2021 in [facility]   Periodic Controlled Substance Monitoring  Random urine drug screen sent today. , Possible medication side effects, risk of tolerance/dependence & alternative treatments discussed. filed at 11/03/2021 0901        Urine Drug Screenings (1 yr)    No resulted procedures found. Medication Contract and Consent for Opioid Use Documents Filed      No documents found                   ASSESSMENT/PLAN:    1. Annual physical exam  Encouraged pt to continue healthy diet and exercise. Recommended routine dental and vision exams. Will f/u with pt regarding fasting lab results. -     CBC Auto Differential  -     Comprehensive Metabolic Panel w/ Reflex to MG  -     Lipid Panel  -     T4, Free  -     TSH without Reflex  -     Vitamin D 25 Hydroxy    2. Migraine without status migrainosus, not intractable, unspecified migraine type  Stable. Pt states that she only gets maybe 2 migraines per year and the Imitrex works well for her.      -     SUMAtriptan (IMITREX) 100 MG tablet; Take 1 tablet by mouth as needed for Migraine, Disp-9 tablet, R-0Normal    3. Genital herpes simplex, unspecified site  Pt states that she only gets a few outbreaks per year. Recommended that she take Valtrex 1 gram daily for 5 days when she has an outbreak. F/u with PCP or OB/Gyn as needed. -     valACYclovir (VALTREX) 1 g tablet; Take 1 tablet by mouth daily for 5 days, Disp-5 tablet, R-2Normal    4. Fear of flying  See HPI. Pt has tried Hydroxyzine which has not worked for her. She stated that she flies about 5 times per year between work and pleasure.   I have agreed to send in a prescription for lorazepam 10 tabs to take as needed for anxiety d/t flying. UDS sent today and med contract signed and on file. Pt will continue f/u with PCP.    -     LORazepam (ATIVAN) 0.5 MG tablet; Take one tablet by mouth as needed prior to flying., Disp-10 tablet, R-0Normal  -     Drug Panel-PM-HI Res-UR Interp-A    5. Seasonal allergic rhinitis, unspecified trigger  -     fluticasone (FLONASE) 50 MCG/ACT nasal spray; USE 1 SPRAY IN EACH NOSTRIL TWO TIMES A DAY, Disp-1 each, R-2Normal    6. Influenza vaccine needed  -     INFLUENZA, MDCK QUADV, 2 YRS AND OLDER, IM, PF, PREFILL SYR OR SDV, 0.5ML (FLUCELVAX QUADV, PF)      Return in about 1 year (around 11/3/2022) for Annual Physical.       An electronic signature was used to authenticate this note.     --ELENA Toney - CNP on 11/3/2021 at 9:02 AM

## 2021-11-03 NOTE — PATIENT INSTRUCTIONS
Patient Education        Influenza (Flu) Vaccine: Care Instructions  Your Care Instructions     Influenza (flu) is an infection in the lungs and breathing passages. It is caused by the influenza virus. There are different strains, or types, of the flu virus every year. The flu comes on quickly. It can cause a cough, stuffy nose, fever, chills, tiredness, and aches and pains. These symptoms may last up to 10 days. The flu can make you feel very sick, but most of the time it doesn't lead to other problems. But it can cause serious problems in people who are older or who have a long-term illness, such as heart disease or diabetes. You can help prevent the flu by getting a flu vaccine every year, as soon as it is available. You cannot get the flu from the vaccine. The vaccine prevents most cases of the flu. But even when the vaccine doesn't prevent the flu, it can make symptoms less severe and reduce the chance of problems from the flu. Sometimes, young children and people who have an immune system problem may have a slight fever or muscle aches or pains 6 to 12 hours after getting the shot. These symptoms usually last 1 or 2 days. Follow-up care is a key part of your treatment and safety. Be sure to make and go to all appointments, and call your doctor if you are having problems. It's also a good idea to know your test results and keep a list of the medicines you take. Who should get the flu vaccine? Everyone age 7 months or older should get a flu vaccine each year. It lowers the chance of getting and spreading the flu. The vaccine is very important for people who are at high risk for getting other health problems from the flu. This includes:  · Anyone 48years of age or older. · People who live in a long-term care center, such as a nursing home. · All children 6 months through 25years of age. · Adults and children 6 months and older who have long-term heart or lung problems, such as asthma.   · Adults and children 6 months and older who needed medical care or were in a hospital during the past year because of diabetes, chronic kidney disease, or a weak immune system (including HIV or AIDS). · Women who will be pregnant during the flu season. · People who have any condition that can make it hard to breathe or swallow (such as a brain injury or muscle disorders). · People who can give the flu to others who are at high risk for problems from the flu. This includes all health care workers and close contacts of people age 72 or older. Who should not get the flu vaccine? The person who gives the vaccine may tell you not to get it if you:  · Have a severe allergy to eggs or any part of the vaccine. · Have had a severe reaction to a flu vaccine in the past.  · Have had Guillain-Barré syndrome (GBS). · Are sick with a fever. (Get the vaccine when symptoms are gone.)  How can you care for yourself at home? · If you or your child has a sore arm or a slight fever after the vaccine, take an over-the-counter pain medicine, such as acetaminophen (Tylenol) or ibuprofen (Advil, Motrin). Read and follow all instructions on the label. Do not give aspirin to anyone younger than 20. It has been linked to Reye syndrome, a serious illness. · Do not take two or more pain medicines at the same time unless the doctor told you to. Many pain medicines have acetaminophen, which is Tylenol. Too much acetaminophen (Tylenol) can be harmful. When should you call for help? Call 911 anytime you think you may need emergency care. For example, call if after getting the flu vaccine:    · You have symptoms of a severe reaction to the flu vaccine. Symptoms of a severe reaction may include:  ? Severe difficulty breathing. ? Sudden raised, red areas (hives) all over your body. ? Severe lightheadedness.    Call your doctor now or seek immediate medical care if after getting the flu vaccine:    · You think you are having a reaction to the flu vaccine, such as a new fever. Watch closely for changes in your health, and be sure to contact your doctor if you have any problems. Where can you learn more? Go to https://"[email or web address].EchoSign. org and sign in to your [employer] account. Enter Y385 in the [facility] box to learn more about \"Influenza (Flu) Vaccine: Care Instructions. \"     If you do not have an account, please click on the \"Sign Up Now\" link. Current as of: August 31, 2020               Content Version: 13.0  © 2006-2021 [employer]. Care instructions adapted under license by [facility] [place] ([facility]). If you have questions about a medical condition or this instruction, always ask your healthcare professional. [place] [address] any warranty or liability for your use of this information. Patient Education        Well Visit, Women 48 to 72: Care Instructions  Overview     Well visits can help you stay healthy. Your doctor has checked your overall health and may have suggested ways to take good care of yourself. Your doctor also may have recommended tests. At home, you can help prevent illness with healthy eating, regular exercise, and other steps. Follow-up care is a key part of your treatment and safety. Be sure to make and go to all appointments, and call your doctor if you are having problems. It's also a good idea to know your test results and keep a list of the medicines you take. How can you care for yourself at home? · Get screening tests that you and your doctor decide on. Screening helps find diseases before any symptoms appear. · Eat healthy foods. Choose fruits, vegetables, whole grains, protein, and low-fat dairy foods. Limit fat, especially saturated fat. Reduce salt in your diet. · Limit alcohol. Have no more than 1 drink a day or 7 drinks a week. · Get at least 30 minutes of exercise on most days of the week. Walking is a good choice.  You also may want to do other activities, such as running, swimming, cycling, or playing tennis or team sports. · Reach and stay at a healthy weight. This will lower your risk for many problems, such as obesity, diabetes, heart disease, and high blood pressure. · Do not smoke. Smoking can make health problems worse. If you need help quitting, talk to your doctor about stop-smoking programs and medicines. These can increase your chances of quitting for good. · Care for your mental health. It is easy to get weighed down by worry and stress. Learn strategies to manage stress, like deep breathing and mindfulness, and stay connected with your family and community. If you find you often feel sad or hopeless, talk with your doctor. Treatment can help. · Talk to your doctor about whether you have any risk factors for sexually transmitted infections (STIs). You can help prevent STIs if you wait to have sex with a new partner (or partners) until you've each been tested for STIs. It also helps if you use condoms (male or female condoms) and if you limit your sex partners to one person who only has sex with you. Vaccines are available for some STIs. · If you think you may have a problem with alcohol or drug use, talk to your doctor. This includes prescription medicines (such as amphetamines and opioids) and illegal drugs (such as cocaine and methamphetamine). Your doctor can help you figure out what type of treatment is best for you. · Protect your skin from too much sun. When you're outdoors from 10 a.m. to 4 p.m., stay in the shade or cover up with clothing and a hat with a wide brim. Wear sunglasses that block UV rays. Even when it's cloudy, put broad-spectrum sunscreen (SPF 30 or higher) on any exposed skin. · See a dentist one or two times a year for checkups and to have your teeth cleaned. · Wear a seat belt in the car. When should you call for help?   Watch closely for changes in your health, and be sure to contact your doctor if you have any problems or symptoms that concern you. Where can you learn more? Go to https://chpepiceweb.health-partners. org and sign in to your [employer] account. Enter C440 in the [facility] box to learn more about \"Well Visit, Women 50 to 72: Care Instructions. \"     If you do not have an account, please click on the \"Sign Up Now\" link. Current as of: February 11, 2021               Content Version: 13.0  © 4765-4985 [employer] Inc[employer]. Care instructions adapted under license by [facility] [place] ([facility]). If you have questions about a medical condition or this instruction, always ask your healthcare professional. [address] any warranty or liability for your use of this information.

## 2021-11-03 NOTE — PROGRESS NOTES
2021 - 2022 Flu Vaccine Questionnaire    VIS given -  Yes    1. Have you received any other vaccine within the last 14 days? No  2. Do you currently have an active infectious or acute respiratory illness or fever? No  3. Are you taking steroids or immune suppressive drugs? No  4. Have you ever had a reaction to a flu vaccine? No  5. Are you allergic to eggs, egg products, chicken, Thimerosal (preservative) Gentamycin, polymixin, neomycin or Latex? No  6. Have you ever had Guillian [place] Syndrome?   No

## 2021-11-08 LAB
6-ACETYLMORPHINE: NOT DETECTED
7-AMINOCLONAZEPAM: NOT DETECTED
ALPHA-OH-ALPRAZOLAM: NOT DETECTED
ALPHA-OH-MIDAZOLAM, URINE: NOT DETECTED
ALPRAZOLAM: NOT DETECTED
AMPHETAMINE: NOT DETECTED
BARBITURATES: NOT DETECTED
BENZOYLECGONINE: NOT DETECTED
BUPRENORPHINE: NOT DETECTED
CARISOPRODOL: NOT DETECTED
CLONAZEPAM: NOT DETECTED
CODEINE: NOT DETECTED
CREATININE URINE: <20 MG/DL (ref 20–400)
DIAZEPAM: NOT DETECTED
DRUGS EXPECTED: ABNORMAL
EER PAIN MGT DRUG PANEL, HIGH RES/EMIT U: ABNORMAL
ETHYL GLUCURONIDE: PRESENT
FENTANYL: NOT DETECTED
GABAPENTIN: NOT DETECTED
HYDROCODONE: NOT DETECTED
HYDROMORPHONE: NOT DETECTED
LORAZEPAM: NOT DETECTED
MARIJUANA METABOLITE: NOT DETECTED
MDA: NOT DETECTED
MDEA: NOT DETECTED
MDMA URINE: NOT DETECTED
MEPERIDINE: NOT DETECTED
METHADONE: NOT DETECTED
METHAMPHETAMINE: NOT DETECTED
METHYLPHENIDATE: NOT DETECTED
MIDAZOLAM: NOT DETECTED
MORPHINE: NOT DETECTED
NALOXONE: NOT DETECTED
NORBUPRENORPHINE, FREE: NOT DETECTED
NORDIAZEPAM: NOT DETECTED
NORFENTANYL: NOT DETECTED
NORHYDROCODONE, URINE: NOT DETECTED
NOROXYCODONE: NOT DETECTED
NOROXYMORPHONE, URINE: NOT DETECTED
OXAZEPAM: NOT DETECTED
OXYCODONE: NOT DETECTED
OXYMORPHONE: NOT DETECTED
PAIN MANAGEMENT DRUG PANEL: ABNORMAL
PAIN MANAGEMENT DRUG PANEL: ABNORMAL
PCP: NOT DETECTED
PHENTERMINE: NOT DETECTED
PREGABALIN: NOT DETECTED
TAPENTADOL, URINE: NOT DETECTED
TAPENTADOL-O-SULFATE, URINE: NOT DETECTED
TEMAZEPAM: NOT DETECTED
TRAMADOL: NOT DETECTED
ZOLPIDEM: NOT DETECTED

## 2022-01-13 ENCOUNTER — HOSPITAL ENCOUNTER (OUTPATIENT)
Dept: GENERAL RADIOLOGY | Age: 52
Discharge: HOME | End: 2022-01-13
Payer: COMMERCIAL

## 2022-01-13 ENCOUNTER — HOSPITAL ENCOUNTER (OUTPATIENT)
Age: 52
Discharge: HOME | End: 2022-01-13
Payer: COMMERCIAL

## 2022-01-13 DIAGNOSIS — M54.51 VERTEBROGENIC LOW BACK PAIN: ICD-10-CM

## 2022-01-13 PROCEDURE — 72100 X-RAY EXAM L-S SPINE 2/3 VWS: CPT

## 2022-04-20 ENCOUNTER — TELEMEDICINE (OUTPATIENT)
Dept: PRIMARY CARE CLINIC | Age: 52
End: 2022-04-20
Payer: COMMERCIAL

## 2022-04-20 DIAGNOSIS — R05.9 COUGH: ICD-10-CM

## 2022-04-20 DIAGNOSIS — J01.00 ACUTE NON-RECURRENT MAXILLARY SINUSITIS: Primary | ICD-10-CM

## 2022-04-20 PROCEDURE — 99213 OFFICE O/P EST LOW 20 MIN: CPT | Performed by: NURSE PRACTITIONER

## 2022-04-20 RX ORDER — AMOXICILLIN AND CLAVULANATE POTASSIUM 875; 125 MG/1; MG/1
1 TABLET, FILM COATED ORAL 2 TIMES DAILY
Qty: 20 TABLET | Refills: 0 | Status: SHIPPED | OUTPATIENT
Start: 2022-04-20 | End: 2022-04-30

## 2022-04-20 ASSESSMENT — ENCOUNTER SYMPTOMS
CHEST TIGHTNESS: 0
WHEEZING: 0
SHORTNESS OF BREATH: 0
SINUS PRESSURE: 1
COUGH: 1
GASTROINTESTINAL NEGATIVE: 1

## 2022-04-20 NOTE — PROGRESS NOTES
2022    TELEHEALTH EVALUATION -- Audio/Visual (During BRYSV-54 public health emergency)    HPI:    Franca Sanford (:  1970) has requested an audio/video evaluation for the following concern(s):    Request visit today for ongoing congestion and cough. Has been sick for 3 weeks. Sinus pressure and PND with cough some yellowish/green discharge noted. Delsym has been helping with cough. Mucinex and sudafed very little improvement. Has taken home COVID test and was negative. No fever/chills. No SOB. No wheezing. Does feel pressure ears/head. Recommend being seen in office if continues for further evaluation of lungs and ears. Review of Systems   Constitutional: Negative for chills, fatigue and fever. HENT: Positive for congestion, ear pain (ear fullness), postnasal drip and sinus pressure. Respiratory: Positive for cough. Negative for chest tightness, shortness of breath and wheezing. Cardiovascular: Negative. Gastrointestinal: Negative. Genitourinary: Negative. Musculoskeletal: Negative. Skin: Negative. Neurological: Negative. Psychiatric/Behavioral: Negative. Prior to Visit Medications    Medication Sig Taking? Authorizing Provider   amoxicillin-clavulanate (AUGMENTIN) 875-125 MG per tablet Take 1 tablet by mouth 2 times daily for 10 days Yes ELENA Ponce CNP   LORazepam (ATIVAN) 0.5 MG tablet Take one tablet by mouth as needed prior to flying.   ELENA Li CNP   fluticasone (FLONASE) 50 MCG/ACT nasal spray USE 1 SPRAY IN EACH NOSTRIL TWO TIMES A DAY  ELENA Li CNP   SUMAtriptan (IMITREX) 100 MG tablet Take 1 tablet by mouth as needed for Migraine  ELENA Li CNP   Cholecalciferol (VITAMIN D) 50 MCG (2000) CAPS capsule Take 1 capsule by mouth daily  Historical Provider, MD   conjugated estrogens (PREMARIN) 0.625 MG/GM vaginal cream Apply 0.5 grams twice weekly (Monday/Thursday)  Jose Manuel Lazar MD   cetirizine (ZYRTEC) 10 MG tablet Take 10 mg by mouth daily  Historical Provider, MD   Famotidine (PEPCID PO) Take by mouth as needed  Historical Provider, MD   Multiple Vitamin (MULTI-VITAMIN DAILY PO) Take by mouth daily  Historical Provider, MD   CALCIUM PO Take by mouth daily  Historical Provider, MD   Omega-3 Fatty Acids (FISH OIL PO) Take by mouth daily  Historical Provider, MD   ketoconazole (NIZORAL) 2 % shampoo Apply topically daily as needed for Itching (psoriasis) Apply topically daily as needed. Historical Provider, MD   fluocinonide (LIDEX) 0.05 % external solution Apply topically 2 times daily Apply topically 2 times daily. Historical Provider, MD   desonide (DESOWEN) 0.05 % cream Apply topically 2 times daily Apply topically 2 times daily. Historical Provider, MD   ketoconazole (NIZORAL) 2 % cream Apply topically daily Apply topically daily.   Historical Provider, MD       Social History     Tobacco Use    Smoking status: Never Smoker    Smokeless tobacco: Never Used   Substance Use Topics    Alcohol use: Yes     Comment: occasionally    Drug use: No        No Known Allergies,   Past Medical History:   Diagnosis Date    Allergic rhinitis     Anxiety     Esophageal reflux 2/13/2020    Headache     Rash     Recurrent upper respiratory infection (URI)     Spondylosis without myelopathy or radiculopathy, cervical region 11/13/2018    Tinnitus    ,   Past Surgical History:   Procedure Laterality Date    COLONOSCOPY  08/07/2020    NL    COLONOSCOPY N/A 8/7/2020    COLONOSCOPY DIAGNOSTIC performed by Varinder Parker MD at [address] [place] [address]   ,   Social History     Tobacco Use    Smoking status: Never Smoker    Smokeless tobacco: Never Used   Substance Use Topics    Alcohol use: Yes     Comment: occasionally    Drug use: No   ,   Family History   Problem Relation Age of Onset    No Known Problems Mother     No Known Problems Father     Diabetes Maternal Grandfather     Diabetes Paternal Grandmother    , Immunization History   Administered Date(s) Administered    COVID-19, Alex Files, Primary or Immunocompromised, PF, 100mcg/0.5mL 01/07/2021, 02/04/2021, 11/12/2021    Influenza Virus Vaccine 10/11/2011, 11/07/2014, 11/01/2016    Influenza, MDCK Quadv, IM, PF (Flucelvax 2 yrs and older) 11/03/2021    Influenza, [place] Light, IM, PF (6 mo and older Fluzone, Flulaval, Fluarix, and 3 yrs and older Afluria) 10/18/2018, 09/29/2020    Influenza, [place] Light, Recombinant, IM PF (Flublok 18 yrs and older) 10/31/2019    Tdap (Boostrix, Adacel) 09/08/2009, 10/31/2019    Zoster Recombinant (Shingrix) 10/31/2019, 11/12/2020       PHYSICAL EXAMINATION:  [ INSTRUCTIONS:  \"[x]\" Indicates a positive item  \"[]\" Indicates a negative item  -- DELETE ALL ITEMS NOT EXAMINED]  Vital Signs: (As obtained by patient/caregiver or practitioner observation)    Blood pressure-  Heart rate-    Respiratory rate-    Temperature-  Pulse oximetry-     Constitutional: [x] Appears well-developed and well-nourished [x] No apparent distress      [] Abnormal-   Mental status  [x] Alert and awake  [x] Oriented to person/place/time [x]Able to follow commands      Eyes:  EOM    [x]  Normal  [] Abnormal-  Sclera  [x]  Normal  [] Abnormal -         Discharge [x]  None visible  [] Abnormal -    HENT:   [x] Normocephalic, atraumatic.   [] Abnormal   [] Mouth/Throat: Mucous membranes are moist.     External Ears [x] Normal  [] Abnormal-     Neck: [x] No visualized mass     Pulmonary/Chest: [x] Respiratory effort normal.  [x] No visualized signs of difficulty breathing or respiratory distress        [] Abnormal-      Musculoskeletal:   [] Normal gait with no signs of ataxia         [x] Normal range of motion of neck        [] Abnormal-       Neurological:        [x] No Facial Asymmetry (Cranial nerve 7 motor function) (limited exam to video visit)          [x] No gaze palsy        [] Abnormal-         Skin:        [x] No significant exanthematous lesions or discoloration noted on facial skin         [] Abnormal-            Psychiatric:       [x] Normal Affect [] No Hallucinations        [] Abnormal-     Other pertinent observable physical exam findings-     ASSESSMENT/PLAN:  1. Acute non-recurrent maxillary sinusitis  Notify office if you do not improve or have worsening of condition. Take medication as prescribed. Take antibiotics medication until all gone. Take probiotics to help with any GI discomfort while on antibiotics. Drink plenty of fluids and rest.  Practice good hand hygiene. May sleep with humidifier as needed. Gargle with warm salt water 3-4 times a day to help with sore throat. You can use ice, warm chicken noodle soup, soft foods, popsicles and cough drops to help soothe your throat. May take Tylenol/Ibuprofen (alternate) as needed for fever/pain. Cover your mouth and nose when you cough or sneeze. - amoxicillin-clavulanate (AUGMENTIN) 875-125 MG per tablet; Take 1 tablet by mouth 2 times daily for 10 days  Dispense: 20 tablet; Refill: 0    Please refer to educational handouts attached to AVS.     Return if symptoms worsen or fail to improve. Jaclyn Quintanilla, was evaluated through a synchronous (real-time) audio-video encounter. The patient (or guardian if applicable) is aware that this is a billable service, which includes applicable co-pays. This Virtual Visit was conducted with patient's (and/or legal guardian's) consent. The visit was conducted pursuant to the emergency declaration under the [address] [place] [address], [address] [place] [address] authority and the [employer] and [employer] General Act. Patient identification was verified, and a caregiver was present when appropriate. The patient was located at home in a state where the provider was licensed to provide care.       Total time spent on this encounter: 20 minutes    --ELENA Jensen CNP on 4/20/2022 at 4:48 PM    An electronic signature was used to authenticate this note.

## 2022-04-20 NOTE — PATIENT INSTRUCTIONS
1. Acute non-recurrent maxillary sinusitis  Notify office if you do not improve or have worsening of condition. Take medication as prescribed. Take antibiotics medication until all gone. Drink plenty of fluids and rest.  Practice good hand hygiene. May sleep with humidifier as needed. Gargle with warm salt water 3-4 times a day to help with sore throat. You can use ice, warm chicken noodle soup, soft foods, popsicles and cough drops to help soothe your throat. May take Tylenol/Ibuprofen (alternate) as needed for fever/pain. Cover your mouth and nose when you cough or sneeze. Patient Education        Cough: Care Instructions  Overview     A cough is your body's response to something that bothers your throat or airways. Many things can cause a cough. You might cough because of a cold or the flu, bronchitis, or asthma. Smoking, postnasal drip, allergies, and stomachacid that backs up into your throat also can cause coughs. A cough is a symptom, not a disease. Most coughs stop when the cause, such as acold, goes away. You can take a few steps at home to cough less and feel better. Follow-up care is a key part of your treatment and safety. Be sure to make and go to all appointments, and call your doctor if you are having problems. It's also a good idea to know your test results and keep alist of the medicines you take. How can you care for yourself at home?  Drink lots of water and other fluids. This helps thin the mucus and soothes a dry or sore throat. Honey or lemon juice in hot water or tea may ease a dry cough.  Take cough medicine as directed by your doctor.  Prop up your head on pillows to help you breathe and ease a dry cough.  Try cough drops or hard candy to soothe a dry or sore throat.  Do not smoke. Avoid secondhand smoke. If you need help quitting, talk to your doctor about stop-smoking programs and medicines. These can increase your chances of quitting for good.   When should you call for help? Call 911 anytime you think you may need emergency care. For example, call if:     You have severe trouble breathing. Call your doctor now or seek immediate medical care if:     You cough up blood.      You have new or worse trouble breathing.      You have a new or higher fever.      You have a new rash. Watch closely for changes in your health, and be sure to contact your doctor if:     You cough more deeply or more often, especially if you notice more mucus or a change in the color of your mucus.      You have new symptoms, such as a sore throat, an earache, or sinus pain.      You do not get better as expected. Where can you learn more? Go to https://[employer] Inc.peMakerCraft.EnzymeRx. org and sign in to your [employer] account. Enter D279 in the ATOMOO box to learn more about \"Cough: Care Instructions. \"     If you do not have an account, please click on the \"Sign Up Now\" link. Current as of: July 6, 2021               Content Version: 13.2  © 2006-2022 [employer]. Care instructions adapted under license by [facility] . If you have questions about a medical condition or this instruction, always ask your healthcare professional. [place] [address] any warranty or liability for your use of this information. Patient Education        Saline Nasal Washes: Care Instructions  Overview     Saline nasal washes help keep the nasal passages open by washing out thick or dried mucus. This simple remedy can help relieve symptoms of allergies, sinusitis, and colds. It also can make the nose feel more comfortable by keeping the mucous membranes moist. You may notice a little burning sensation in your nose the first few times you use the solution, but this usually getsbetter in a few days. Follow-up care is a key part of your treatment and safety. Be sure to make and go to all appointments, and call your doctor if you are having problems.  It's also a good idea to know your test results and keep alist of the medicines you take. How can you care for yourself at home?  You can buy premixed saline solution in a squeeze bottle or other sinus rinse products at a drugstore. Read and follow the instructions on the label.  You also can make your own saline solution by adding 1 teaspoon of non-iodized salt and 1 teaspoon of baking soda to 2 cups of distilled or boiled and cooled water.  If you use a homemade solution, use a squeeze bottle or neti pot to get the solution into your nose. Room temperature or slightly warmed water may be more comfortable. Make sure it isn't hot.  Stand over the sink with your head tilted forward and slightly to one side. Put only the tip of the syringe or squeeze bottle into the nostril that is farther away from the sink. (The nostril closest to the sink will drain the fluid.) Gently squirt the solution into the nostril and toward the back of your head with your mouth open. The solution should flow out the other nostril. Repeat on the other side. Some sneezing and gagging are normal at first.   Gently blow your nose.  Clean the syringe or bottle after each use.  Repeat this 2 or 3 times a day.  Use nasal washes gently if you have nosebleeds often. When should you call for help? Watch closely for changes in your health, and be sure to contact your doctor if:     Your symptoms do not get better.      You have problems doing the nasal washes. Where can you learn more? Go to https://iMoney Groupalka.Vannevar Technology. org and sign in to your LIFX account. Enter [id number] in the Yangaroo box to learn more about \"Saline Nasal Washes: Care Instructions. \"     If you do not have an account, please click on the \"Sign Up Now\" link. Current as of: September 8, 2021               Content Version: 13.2  © 6689-0160 Healthwise, Incorporated. Care instructions adapted under license by [facility] ([facility]).  If you have questions about a medical condition or this instruction, always ask your healthcare professional. [address] any warranty or liability for your use of this information. Patient Education        Sinusitis: Care Instructions  Your Care Instructions     Sinusitis is an infection of the lining of the sinus cavities in your head. Sinusitis often follows a cold. It causes pain and pressure in your head andface. In most cases, sinusitis gets better on its own in 1 to 2 weeks. But some mildsymptoms may last for several weeks. Sometimes antibiotics are needed. Follow-up care is a key part of your treatment and safety. Be sure to make and go to all appointments, and call your doctor if you are having problems. It's also a good idea to know your test results and keep alist of the medicines you take. How can you care for yourself at home?  Take an over-the-counter pain medicine, such as acetaminophen (Tylenol), ibuprofen (Advil, Motrin), or naproxen (Aleve). Read and follow all instructions on the label.  If the doctor prescribed antibiotics, take them as directed. Do not stop taking them just because you feel better. You need to take the full course of antibiotics.  Be careful when taking over-the-counter cold or flu medicines and Tylenol at the same time. Many of these medicines have acetaminophen, which is Tylenol. Read the labels to make sure that you are not taking more than the recommended dose. Too much acetaminophen (Tylenol) can be harmful.  Breathe warm, moist air from a steamy shower, a hot bath, or a sink filled with hot water. Avoid cold, dry air. Using a humidifier in your home may help. Follow the directions for cleaning the machine.  Use saline (saltwater) nasal washes. This can help keep your nasal passages open and wash out mucus and bacteria. You can buy saline nose drops at a grocery store or drugstore.  Or you can make your own at home by adding 1 teaspoon of salt and 1 teaspoon of baking soda to 2 cups of distilled water. If you make your own, fill a bulb syringe with the solution, insert the tip into your nostril, and squeeze gently. Mikala Roes your nose.  Put a hot, wet towel or a warm gel pack on your face 3 or 4 times a day for 5 to 10 minutes each time.  Try a decongestant nasal spray like oxymetazoline (Afrin). Do not use it for more than 3 days in a row. Using it for more than 3 days can make your congestion worse. When should you call for help? Call your doctor now or seek immediate medical care if:     You have new or worse swelling or redness in your face or around your eyes.      You have a new or higher fever. Watch closely for changes in your health, and be sure to contact your doctor if:     You have new or worse facial pain.      The mucus from your nose becomes thicker (like pus) or has new blood in it.      You are not getting better as expected. Where can you learn more? Go to https://Frogmetrics.Musicplayr. org and sign in to your [facility] account. Enter V631 in the [facility] box to learn more about \"Sinusitis: Care Instructions. \"     If you do not have an account, please click on the \"Sign Up Now\" link. Current as of: September 8, 2021               Content Version: 13.2  © 7372-8963 [employer]. Care instructions adapted under license by [facility] ([facility]). If you have questions about a medical condition or this instruction, always ask your healthcare professional. [place] [address] any warranty or liability for your use of this information.

## 2022-04-21 ENCOUNTER — TELEPHONE (OUTPATIENT)
Dept: FAMILY MEDICINE CLINIC | Age: 52
End: 2022-04-21

## 2022-04-21 RX ORDER — DOXYCYCLINE HYCLATE 100 MG
100 TABLET ORAL 2 TIMES DAILY
Qty: 20 TABLET | Refills: 0 | Status: SHIPPED | OUTPATIENT
Start: 2022-04-21 | End: 2022-05-01

## 2022-04-21 NOTE — TELEPHONE ENCOUNTER
The augmentin is usually first line and very good for treating sinusitis. What symptoms did she had when taking? Did she take with food?

## 2022-04-21 NOTE — TELEPHONE ENCOUNTER
Pt states within a couple hours of taking the medication she vomited and had diarrhea for 3 hours. She states she was up all night vomiting and it was terrible. Patient states she took the medication with dinner. If something is sent in to the pharmacy it will need to go to [email or web address] because her new insurance no longer uses [facility]. Pharmacy pended.

## 2022-04-21 NOTE — TELEPHONE ENCOUNTER
Doxycycline sent to requested pharmacy instead    I would also recommend taking a probiotic OTC for 2-4 weeks as well to see if this helps.

## 2022-07-06 ENCOUNTER — TELEPHONE (OUTPATIENT)
Dept: FAMILY MEDICINE CLINIC | Age: 52
End: 2022-07-06

## 2022-07-06 DIAGNOSIS — Z00.00 ANNUAL PHYSICAL EXAM: Primary | ICD-10-CM

## 2022-07-06 NOTE — TELEPHONE ENCOUNTER
Please advise if lab work can be placed for pt to have completed before her physical on 11/4/22 to discuss at appt, please call pt once this has been paced.  Thank you

## 2022-07-08 NOTE — TELEPHONE ENCOUNTER
Routine panel ordered per request  Can't guarantee insurance coverage since not ordered during a visit  Needs 8+ hours fasting prior

## 2022-10-10 ENCOUNTER — TELEPHONE (OUTPATIENT)
Dept: FAMILY MEDICINE CLINIC | Age: 52
End: 2022-10-10

## 2022-10-10 ENCOUNTER — TELEMEDICINE (OUTPATIENT)
Dept: PRIMARY CARE CLINIC | Age: 52
End: 2022-10-10
Payer: COMMERCIAL

## 2022-10-10 DIAGNOSIS — R05.1 ACUTE COUGH: ICD-10-CM

## 2022-10-10 DIAGNOSIS — U07.1 COVID-19: Primary | ICD-10-CM

## 2022-10-10 PROCEDURE — 99213 OFFICE O/P EST LOW 20 MIN: CPT | Performed by: NURSE PRACTITIONER

## 2022-10-10 RX ORDER — BENZONATATE 100 MG/1
100 CAPSULE ORAL 3 TIMES DAILY PRN
Qty: 30 CAPSULE | Refills: 0 | Status: SHIPPED | OUTPATIENT
Start: 2022-10-10 | End: 2022-10-17

## 2022-10-10 RX ORDER — NIRMATRELVIR AND RITONAVIR 300-100 MG
KIT ORAL
Qty: 30 TABLET | Refills: 0 | Status: SHIPPED | OUTPATIENT
Start: 2022-10-10 | End: 2022-10-15

## 2022-10-10 ASSESSMENT — ENCOUNTER SYMPTOMS
CHEST TIGHTNESS: 1
COUGH: 1
SINUS PAIN: 1
SINUS PRESSURE: 1
RHINORRHEA: 1

## 2022-10-10 NOTE — PROGRESS NOTES
Upper Respiratory Infection  Patient complains of symptoms of a URI symptoms and testing positive for COVID. Symptoms include congestion, cough, and fever. Onset of symptoms was 1 day ago, gradually worsening since that time. She also c/o achiness, congestion, and cough described as non-productive, without wheezing, dyspnea or hemoptysis, chest is painful during coughing for the past 1 day . She is drinking plenty of fluids. . Treatment to date: OTC cough suppressant, NSAID. Starla Olson (:  1970) is a Established patient, here for evaluation of the following:    ASSESSMENT/PLAN:  Below is the assessment and plan developed based on review of pertinent history, physical exam, labs, studies, and medications. 1. COVID-19  -     nirmatrelvir/ritonavir (PAXLOVID, 300/100,) 20 x 150 MG & 10 x 100MG TBPK; Take 3 tablets (two 150 mg nirmatrelvir and one 100 mg ritonavir tablets) by mouth every 12 hours for 5 days. , Disp-30 tablet, R-0Normal  -     benzonatate (TESSALON) 100 MG capsule; Take 1 capsule by mouth 3 times daily as needed for Cough, Disp-30 capsule, R-0Normal  2. Acute cough  -     benzonatate (TESSALON) 100 MG capsule; Take 1 capsule by mouth 3 times daily as needed for Cough, Disp-30 capsule, R-0Normal  No follow-ups on file. SUBJECTIVE/OBJECTIVE:  This is a 45year old patient of Dr. Gabriel Junior who consents to this virtual visit. She tested positive for covid today, after becoming ill on . In addition to above complaints, her chest feels tight. Advised patient on resting if she gets SOB, and when to seek emergency treatment. CDC recommendations include: Stay at home for 5 days from test day or symptom onset. If you were severely ill extend that to 10 days. Isolate at home from others or wear mask if they do not have COVID. You must be fever free and have symptom improvement to end isolation. Please call PCP with any questions or concerns. Sending in paxlovid and benzonate.     Review of Systems   Constitutional:  Positive for appetite change, chills, fatigue and fever. HENT:  Positive for congestion, postnasal drip, rhinorrhea, sinus pressure and sinus pain. Respiratory:  Positive for cough and chest tightness. Musculoskeletal:  Positive for myalgias. Neurological:  Positive for headaches.      Patient-Reported Vitals 10/10/2022   Patient-Reported Weight 125   Patient-Reported Height 54   Patient-Reported Temperature 101         Physical Exam    [INSTRUCTIONS:  \"[x]\" Indicates a positive item  \"[]\" Indicates a negative item  -- DELETE ALL ITEMS NOT EXAMINED]    Constitutional: [x] Appears well-developed and well-nourished [x] No apparent distress      [] Abnormal -     Mental status: [x] Alert and awake  [x] Oriented to person/place/time [x] Able to follow commands    [] Abnormal -     Eyes:   EOM    [x]  Normal    [] Abnormal -   Sclera  [x]  Normal    [] Abnormal -          Discharge [x]  None visible   [] Abnormal -     HENT: [x] Normocephalic, atraumatic  [] Abnormal -   [x] Mouth/Throat: Mucous membranes are moist    External Ears [x] Normal  [] Abnormal -    Neck: [x] No visualized mass [] Abnormal -     Pulmonary/Chest: [x] Respiratory effort normal   [x] No visualized signs of difficulty breathing or respiratory distress        [] Abnormal -      Musculoskeletal:   [x] Normal gait with no signs of ataxia         [x] Normal range of motion of neck        [] Abnormal -     Neurological:        [x] No Facial Asymmetry (Cranial nerve 7 motor function) (limited exam due to video visit)          [x] No gaze palsy        [] Abnormal -          Skin:        [x] No significant exanthematous lesions or discoloration noted on facial skin         [] Abnormal -            Psychiatric:       [x] Normal Affect [] Abnormal -         Other pertinent observable physical exam findings:-      On this date 10/10/2022 I have spent 20 minutes reviewing previous notes, test results and face to face (virtual) with the patient discussing the diagnosis and importance of compliance with the treatment plan as well as documenting on the day of the visit. Alvaro Jack, was evaluated through a synchronous (real-time) audio-video encounter. The patient (or guardian if applicable) is aware that this is a billable service, which includes applicable co-pays. This Virtual Visit was conducted with patient's (and/or legal guardian's) consent. The visit was conducted pursuant to the emergency declaration under the [address] [place] [address], [address] [place] [address] authority and the [employer] and [employer] General Act. Patient identification was verified, and a caregiver was present when appropriate. The patient was located at home in a state where the provider was licensed to provide care.     --EELNA Caldwell

## 2022-10-10 NOTE — TELEPHONE ENCOUNTER
Message/Telephone call regarding - New or worsening symptoms      Symptoms reported - Pulmonary / Respiratory / Ears, Nose, Throat- fever / chills, chest congestion / wheezing , cough - no sputum, nasal congestion / runny nose, headache, and body aches / myalgias - ALERT - INDICATE RED VISIT IN APPT NOTES  Pain Scale - N/A     Symptom Onset Date - 10/09/2022  Onset - with a gradual onset    Aggravating factors - NA  Relieving actions and treatment(s) attempted - None    Last Appointment at [facility] - 11/3/2021  Next Appointment - [email or web address]      Is there any other information to share with PCP -  no    REFRESH after scheduling appointment.     Future Appointments   Date Time Provider Ethel Dugan   10/10/2022  9:00 AM ELENA Carmichael - CNP [facility] VIRTUAL [facility]   10/19/2022  8:30 AM Merary Jennings AND AUDIO [facility]   10/19/2022  9:00 AM Adal Valadez MD [facility] ENT [facility]   11/4/2022 10:00 AM MD SHAKA Chen

## 2022-10-18 NOTE — PROGRESS NOTES
Lovella Meigs   1970, 45 y.o. female   [id number]       Referring Provider: Wild Joaquin MD  Referral Type: In an order in [address] [place] [address]    Reason for Visit: Evaluation of suspected change in hearing, tinnitus, or balance. ADULT AUDIOLOGIC EVALUATION      Lovella Meigs is a 45 y.o. female seen today, 10/19/2022 , for a recheck audiologic evaluation. Patient was seen by Wild Joaquin MD following today's evaluation. AUDIOLOGIC AND OTHER PERTINENT MEDICAL HISTORY:      Lovella Meigs reports no significant change in symptoms. Today's visit is a yearly check up evaluation to monitor her tinnitus. She has a history of cerumen impaction and mildly bothersome tinnitus in her left ear. She was diagnosed with COVID last week and experienced some dizziness and various flu-like symptoms at that time, but they appear to be resolving now. She denied otalgia, aural fullness, otorrhea, history of falls, history of head trauma, history of ear surgery, and family history of hearing loss. Her audiologic testing and history from 2020 and 2021 show stable hearing sensitivity without significant changes. She has a documented history of tinnitus and intermittent aural fullness. Date: 10/19/2022     IMPRESSIONS:      Today's results revealed  hearing within normal limits bilaterally. Excellent speech understanding when in quiet. Tympanometry indicates normal middle ear function. Discussed use of tinnitus management strategies. Provided basic tinnitus education, including the neurophysiological model. Follow medical recommendations of Wild Joaquin MD.     ASSESSMENT AND FINDINGS:     Otoscopy unremarkable. RIGHT EAR:  Hearing Sensitivity:Hearing sensitivity within normal limits from 250-8000 Hz  Speech Recognition Threshold: 10 dB HL  Word Recognition: Excellent 100%, based on NU-6 25-word list at 50 dBHL using recorded speech stimuli.     Tympanometry: Normal peak pressure and compliance, Type A tympanogram, consistent with normal middle ear function. LEFT EAR:  Hearing Sensitivity:Hearing sensitivity within normal limits from 250-8000 Hz  Speech Recognition Threshold: 10 dB HL  Word Recognition: Excellent 100%, based on NU-6 25-word list at 50 dBHL using recorded speech stimuli. Tympanometry: Normal peak pressure and compliance, Type A tympanogram, consistent with normal middle ear function. Reliability: Good   Transducer: Inserts    See scanned audiogram dated 10/19/2022 for results. PATIENT EDUCATION:       The following items were discussed with the patient:   - Good Communication Strategies  - Tinnitus Management Strategies      Educational information was shared in the After Visit Summary. RECOMMENDATIONS:                                                                                                                                                                                                                                                            The following items are recommended based on patient report and results from today's appointment:   - Continue medical follow-up with Adelaide Galicia MD.   - Retest hearing as medically indicated and/or sooner if a change in hearing is noted. - Utilize \"Good Communication Strategies\" as discussed to assist in speech understanding with communication partners. - Maintain a sound enriched environment to assist in the management of tinnitus symptoms.        Merary Taylor  Audiologist    Chart CC'd to: Adelaide Galicia MD      Degree of   Hearing Sensitivity dB Range   Within Normal Limits (WNL) 0 - 20   Mild 20 - 40   Moderate 40 - 55   Moderately-Severe 55 - 70   Severe 70 - 90   Profound 90 +

## 2022-10-19 ENCOUNTER — OFFICE VISIT (OUTPATIENT)
Dept: ENT CLINIC | Age: 52
End: 2022-10-19
Payer: COMMERCIAL

## 2022-10-19 ENCOUNTER — PROCEDURE VISIT (OUTPATIENT)
Dept: AUDIOLOGY | Age: 52
End: 2022-10-19
Payer: COMMERCIAL

## 2022-10-19 VITALS
TEMPERATURE: 96.9 F | RESPIRATION RATE: 16 BRPM | BODY MASS INDEX: 21.34 KG/M2 | HEIGHT: 64 IN | DIASTOLIC BLOOD PRESSURE: 80 MMHG | WEIGHT: 125 LBS | HEART RATE: 100 BPM | SYSTOLIC BLOOD PRESSURE: 113 MMHG

## 2022-10-19 DIAGNOSIS — H60.543 ECZEMA OF EXTERNAL EAR, BILATERAL: ICD-10-CM

## 2022-10-19 DIAGNOSIS — R42 VERTIGO: Primary | ICD-10-CM

## 2022-10-19 DIAGNOSIS — H93.12 TINNITUS, LEFT EAR: Primary | ICD-10-CM

## 2022-10-19 DIAGNOSIS — H93.12 TINNITUS, LEFT: ICD-10-CM

## 2022-10-19 PROBLEM — H73.892 TYMPANIC MEMBRANE RETRACTION, LEFT: Status: RESOLVED | Noted: 2020-02-10 | Resolved: 2022-10-19

## 2022-10-19 PROBLEM — H74.8X2: Status: RESOLVED | Noted: 2020-02-10 | Resolved: 2022-10-19

## 2022-10-19 PROCEDURE — 99213 OFFICE O/P EST LOW 20 MIN: CPT | Performed by: OTOLARYNGOLOGY

## 2022-10-19 PROCEDURE — 92567 TYMPANOMETRY: CPT | Performed by: AUDIOLOGIST

## 2022-10-19 PROCEDURE — 92557 COMPREHENSIVE HEARING TEST: CPT | Performed by: AUDIOLOGIST

## 2022-10-19 NOTE — Clinical Note
Hello, Ms. Faye Loaiza has left ear tinnitus - she's interested in TRT, can you place her on the list for this when it becomes available next year? Thanks!

## 2022-10-19 NOTE — PATIENT INSTRUCTIONS
Good Communication Strategies    Communication can be challenging for anyone, but can be especially difficult for those with some degree of hearing loss. While we may not be able to control every factor that may lead to difficulty with communication, there are Good Communication Strategies that we can all use in our day-to-day lives. Communication takes both parties working together for it to be successful. Tips as a Listener:   Control your environment. It is important to limit the amount of background noise in the room when possible. You should also consider having a good light source in the room to best see the other person. Ask for clarification. Instead of saying \"What?\", you can use parts of what you heard to make a new question. For example, if you heard the word \"Thursday\" but not the rest of the week, you may ask \"What was that about Thursday? \" or \"What did you want to do Thursday? \". This shows the person talking that you are listening and will help them better explain what they are saying. Be an advocate for yourself. If you are hearing but not understanding, tell the other person \"I can hear you, but I need you to slow down when you speak. \"  Or if someone is facing the other direction, say \"I cannot hear you when you are not looking at me when we talk. \"       Tips as a Talker:   - Sit or stand 3 to 6 feet away to maximize audibility         -- It is unrealistic to believe someone else will fully hear your message if you are speaking from across the room or in a different room in the house   - Stay at eye level to help with visual cues   - Make sure you have the persons attention before speaking   - Use facial expressions and gestures to accentuate your message   - Raise your voice slightly (do not scream)   - Speak slowly and distinctly   - Use short, simple sentences   - Rephrase your words if the person is having a hard time understanding you    - To avoid distortion, dont speak directly into a persons ear      Some additional items that may be helpful:   - Remain patient - this is important for both parties   - Write down items that still cannot be heard/understood. You may write with pen/paper or consider typing/texting on a cell phone or smart device. - If background noise is unavoidable, try to keep yourself in a good position in the room. By sitting at a shell on the side of the restaurant (preferably a corner), it will be easier to communicate than if you were sitting at a table in the middle with background noise surrounding you. Keep yourself positioned away from music speakers or heavy foot traffic. Tinnitus: Overview and Management Strategies          Many people have some ringing sounds in their ears once in a while. You may hear a roar, a hiss, a tinkle, or a buzz. The sound usually lasts only a few minutes. If it goes on all the time, you may have tinnitus. Tinnitus is usually caused by long-term exposure to loud noise. This damages the nerves in the inner ear. It can occur with all types of hearing loss. It may be a symptom of almost any ear problem. Tinnitus may be caused by a buildup of earwax. Or, it may be caused by ear infections or certain medicines (especially antibiotics or large amounts of aspirin). You can also hear noises in your ears because of an injury to the ears, drinking too much alcohol or caffeine, or a medical condition. Other conditions may also contribute to tinnitus, including: head and neck trauma, temporomandibular joint disorder (TMJ), sinus pressure and barometric trauma, traumatic brain injury, metabolic disorders, autoimmune disorders, stress, and high blood pressure. You may need tests to evaluate your hearing and to find causes of long-lasting tinnitus. Your doctor may suggest one or more treatments to help you cope with the tinnitus. You can also do things at home to help reduce symptoms.     Follow-up care is a key part of your treatment and safety. Be sure to make and go to all appointments, and call your doctor if you are having problems. It's also a good idea to know your test results and keep a list of the medicines you take. How can you care for yourself at home? Limit or cut out alcohol, caffeine, and sodium. They can make your symptoms worse. Do not smoke or use other tobacco products. Nicotine reduces blood flow to the ear and makes tinnitus worse. If you need help quitting, talk to your doctor about stop-smoking programs and medicines. These can increase your chances of quitting for good. Talk to your doctor about whether to stop taking aspirin and similar products such as ibuprofen or naproxen. Get exercise often. It can help improve blood flow to the ear. Ways to manage/cope with tinnitus  Some tinnitus may last a long time. To manage your tinnitus, try to: Avoid noises that you think caused your tinnitus. If you can't avoid loud noises, wear earplugs or earmuffs. Ignore the sound by paying attention to other things. Keeping your brain busy with other tasks or background noise can help your brain not focus on the tinnitus. Try to not give the tinnitus an emotional reaction. Do your best to ignore the sound and not let it bother you. Relax using biofeedback, meditation, or yoga. Feeling stressed and being tired can make tinnitus worse. Play music or white noise to help you sleep. Background noise may cover up the noise that you hear in your ears. You can buy a tabletop machine or a device that sits under your pillow to play soothing sounds, like ocean waves. Smart phones have free apps, such as Whist, Relax Melodies, ReSound Relief, and [employer]. These apps have different types of sounds/noise, some of which you can blend together to find sounds that are most soothing to you.   Hearing aid technology, especially when there is some hearing loss, may help reduce tinnitus symptoms by giving your brain better access to the sounds it is missing. There are some hearing aids with built-in noise generator programs, which may help when amplification alone is not enough. Additional resources may be found through the [employer] at www.elmer.org    When should you call for help? Call 911 anytime you think you may need emergency care. For example, call if:    You have symptoms of a stroke. These may include:  Sudden numbness, tingling, weakness, or loss of movement in your face, arm, or leg, especially on only one side of your body. Sudden vision changes. Sudden trouble speaking. Sudden confusion or trouble understanding simple statements. Sudden problems with walking or balance. A sudden, severe headache that is different from past headaches. Call your doctor now or seek immediate medical care if:    You develop other symptoms. These may include hearing loss (or worse hearing loss), balance problems, dizziness, nausea, or vomiting. Watch closely for changes in your health, and be sure to contact your doctor if:    Your tinnitus moves from both ears to one ear. Your hearing loss gets worse within 1 day after an ear injury. Your tinnitus or hearing loss does not get better within 1 week after an ear injury. Your tinnitus bothers you enough that you want to take medicines to help you cope with it. If you notice changes in your tinnitus and/or your hearing, it is recommended that you have your hearing tested by your audiologist and to follow-up with your physician that manages your hearing loss (such as your ENT or Primary Care doctor).

## 2022-10-19 NOTE — PROGRESS NOTES
(drove) - then developed severe dizziness with a \"floaty feeling\" - no joel room spinning but she felt she was slowly rocking back and forth. Improved with stationary positioning, eye closure, dramamine. This has largely improved, however has had several episodes over the past few days (2 yesterday, none today) has had episodes lasting 15-30 minutes. No hearing loss, no worsened tinnitus, no vertigo with episodes. She does yoga and pilates, but not recently. Has occasionally been using peroxide for wax management. Scalp/ear psoriasis has been managed with derm-otic, sparingly, which improves symptoms. Past Medical History     Past Medical History:   Diagnosis Date    Allergic rhinitis     Anxiety     Esophageal reflux 2/13/2020    Headache     Rash     Recurrent upper respiratory infection (URI)     Spondylosis without myelopathy or radiculopathy, cervical region 11/13/2018    Tinnitus        Past Surgical History     Past Surgical History:   Procedure Laterality Date    COLONOSCOPY  08/07/2020    NL    COLONOSCOPY N/A 8/7/2020    COLONOSCOPY DIAGNOSTIC performed by Caio Mccurdy MD at [address] History     Family History   Problem Relation Age of Onset    No Known Problems Mother     No Known Problems Father     Diabetes Maternal Grandfather     Diabetes Paternal Grandmother        Social History     Social History     Tobacco Use    Smoking status: Never    Smokeless tobacco: Never   Substance Use Topics    Alcohol use: Yes     Comment: occasionally    Drug use: No        Allergies     No Known Allergies    Medications     Current Outpatient Medications   Medication Sig Dispense Refill    LORazepam (ATIVAN) 0.5 MG tablet Take one tablet by mouth as needed prior to flying.  10 tablet 0    fluticasone (FLONASE) 50 MCG/ACT nasal spray USE 1 SPRAY IN EACH NOSTRIL TWO TIMES A DAY 1 each 2    SUMAtriptan (IMITREX) 100 MG tablet Take 1 tablet by mouth as needed for Migraine 9 tablet 0 Cholecalciferol (VITAMIN D) 50 MCG (2000 UT) CAPS capsule Take 1 capsule by mouth daily      conjugated estrogens (PREMARIN) 0.625 MG/GM vaginal cream Apply 0.5 grams twice weekly (Monday/Thursday) 1 Tube 3    cetirizine (ZYRTEC) 10 MG tablet Take 10 mg by mouth daily      Famotidine (PEPCID PO) Take by mouth as needed      Multiple Vitamin (MULTI-VITAMIN DAILY PO) Take by mouth daily      CALCIUM PO Take by mouth daily      Omega-3 Fatty Acids (FISH OIL PO) Take by mouth daily      ketoconazole (NIZORAL) 2 % shampoo Apply topically daily as needed for Itching (psoriasis) Apply topically daily as needed. fluocinonide (LIDEX) 0.05 % external solution Apply topically 2 times daily Apply topically 2 times daily. desonide (DESOWEN) 0.05 % cream Apply topically 2 times daily Apply topically 2 times daily. ketoconazole (NIZORAL) 2 % cream Apply topically daily Apply topically daily. No current facility-administered medications for this visit.        Review of Systems     REVIEW OF SYSTEMS  The following systems were reviewed and revealed the following in addition to any already discussed in the HPI:    CONSTITUTIONAL: No weight loss, no fever, no night sweats, no chills  EYES: no vision changes, no blurry vision  EARS: no changes in hearing, no otalgia  NOSE: no epistaxis, no rhinorrhea  RESPIRATORY: No difficulty breathing, no shortness of breath  CV: no chest pain, no peripheral vascular disease  HEME: No coagulation disorder, no bleeding disorder  NEURO: No TIA or stroke-like symptoms  SKIN: No new rashes in the head and neck (has history of psoriasis of the head and neck), no recent skin cancers  MOUTH: No no new ulcers, no recent teeth infections  GASTROINTESTINAL: No diarrhea, stomach pain  PSYCH: No anxiety, no depression    PhysicalExam     Vitals:    10/19/22 0901   BP: 113/80   Site: Left Upper Arm   Position: Sitting   Cuff Size: Medium Adult   Pulse: 100   Resp: 16   Temp: 96.9 °F (36.1 °C) TempSrc: Infrared   Weight: 125 lb (56.7 kg)   Height: 5' 3.5\" (1.613 m)       PHYSICAL EXAM  /80 (Site: Left Upper Arm, Position: Sitting, Cuff Size: Medium Adult)   Pulse 100   Temp 96.9 °F (36.1 °C) (Infrared)   Resp 16   Ht 5' 3.5\" (1.613 m)   Wt 125 lb (56.7 kg)   LMP 06/23/2019   BMI 21.80 kg/m²     GENERAL: No Acute Distress, Alert and Oriented, no hoarseness  EYES: EOMI, Anti-icteric  NOSE: On anterior rhinoscopy there is no epistaxis, nasal mucosa within normal limits, no purulent drainage  EARS: Normal external appearance; there is silver scaling over the right postauricular skin consistent with psoriasis. On portable otomicroscopy:  -Right ear: External auditory canal without stenosis, tympanic membrane obscured by cerumen  -Left ear: External auditory canal without stenosis, tympanic membrane obscured by cerumen  FACE: 1/6 House-Brackmann Scale, symmetric, sensation equal bilaterally  ORAL CAVITY: No masses or lesions palpated, uvula is midline, moist mucous membranes, 0+ tonsils, good dentition  NECK: Normal range of motion, no thyromegaly, trachea is midline, no lymphadenopathy, no neck masses, no crepitus  CHEST: Normal respiratory effort, no retractions, breathing comfortably  SKIN: No rashes, normal appearing skin, no evidence of skin lesions/tumors  NEURO: CN 2, 3, 4, 5, 6, 7, 11, 12 intact bilaterally   -No corrective saccade on head thrust testing  -Romberg testing without falls - believes she is falling to the right but to the left on tandem Romberg test    Procedure     Binocular Otomicroscopy     Pre op: Cerumen impaction bilateral  Post op: Normal ear examination     Procedure : Binocular otomicroscopy  Surgeon: EZEQUIEL Perry  Estimated Blood Loss: None    After obtaining consent, the patient was placed in the examination chair in the reclined position.      -Right ear: External auditory canal with cerumen removed with suction and wax curette, tympanic membrane showing no perforations/retractions, middle ear clear  -Left ear: External auditory canal with cerumen removed with suction and wax curette, tympanic membrane showing no perforations/retractions, middle ear clear   * Patient tolerated the procedure well with no complications         Prior Audiogram           Assessment and Plan      Diagnosis Orders   1. Vertigo        2. Eczema of external ear, bilateral        3. Tinnitus, left          68-year-old female with left ear tinnitus since childhood following an ear infection. This is high-pitched in nature, constant, unchanged. She also psoriasis of the scalp and ears, with occasional pruritus and inflammation of the external auditory canal.  We prescribed her Derm otic for this on an as-needed basis and she is doing well. Finally, she has had non-spinning vertigo/dysequilibrium after ousmane COVID-19 - no joel imbalance at this visit, head thrust testing negative, have asked her to attempt exercises that stress her balance systems (pilates, yoga) and notify us with any worsening vertigo, tinnitus, hearing loss. She is interested in tinnitus retraining therapy and we will have her scheduled when this becomes available. Return in about 1 year (around 10/19/2023). Shobha Juarez MD  [facility]  Department of Otolaryngology/Head and Neck Surgery  10/19/22    I have performed a head and neck physical exam personally or was physically present during the key or critical portions of the service. Medical Decision Making: The following items were considered in medical decision making:  Independent review of images  Review / order clinical lab tests  Review / order radiology tests  Decision to obtain old records  Review and summation of old records as accessed through [facility] (a summary of my findings in these old records: reviewed records from 1/8/2020 from [facility])     Portions of this note were dictated using Dragon.  There may be linguistic errors secondary to the use of this program.

## 2022-11-01 DIAGNOSIS — Z00.00 ANNUAL PHYSICAL EXAM: ICD-10-CM

## 2022-11-01 LAB
A/G RATIO: 2.6 (ref 1.1–2.2)
ALBUMIN SERPL-MCNC: 5.1 G/DL (ref 3.4–5)
ALP BLD-CCNC: 66 U/L (ref 40–129)
ALT SERPL-CCNC: 24 U/L (ref 10–40)
ANION GAP SERPL CALCULATED.3IONS-SCNC: 15 MMOL/L (ref 3–16)
AST SERPL-CCNC: 23 U/L (ref 15–37)
BASOPHILS ABSOLUTE: 0 K/UL (ref 0–0.2)
BASOPHILS RELATIVE PERCENT: 0.6 %
BILIRUB SERPL-MCNC: 0.5 MG/DL (ref 0–1)
BUN BLDV-MCNC: 13 MG/DL (ref 7–20)
CALCIUM SERPL-MCNC: 9.9 MG/DL (ref 8.3–10.6)
CHLORIDE BLD-SCNC: 103 MMOL/L (ref 99–110)
CHOLESTEROL, TOTAL: 227 MG/DL (ref 0–199)
CO2: 23 MMOL/L (ref 21–32)
CREAT SERPL-MCNC: 0.8 MG/DL (ref 0.6–1.1)
EOSINOPHILS ABSOLUTE: 0.1 K/UL (ref 0–0.6)
EOSINOPHILS RELATIVE PERCENT: 1.3 %
GFR SERPL CREATININE-BSD FRML MDRD: >60 ML/MIN/{1.73_M2}
GLUCOSE BLD-MCNC: 83 MG/DL (ref 70–99)
HCT VFR BLD CALC: 41.3 % (ref 36–48)
HDLC SERPL-MCNC: 94 MG/DL (ref 40–60)
HEMOGLOBIN: 13.8 G/DL (ref 12–16)
LDL CHOLESTEROL CALCULATED: 124 MG/DL
LYMPHOCYTES ABSOLUTE: 1.4 K/UL (ref 1–5.1)
LYMPHOCYTES RELATIVE PERCENT: 31 %
MCH RBC QN AUTO: 34.1 PG (ref 26–34)
MCHC RBC AUTO-ENTMCNC: 33.4 G/DL (ref 31–36)
MCV RBC AUTO: 102.1 FL (ref 80–100)
MONOCYTES ABSOLUTE: 0.4 K/UL (ref 0–1.3)
MONOCYTES RELATIVE PERCENT: 8.5 %
NEUTROPHILS ABSOLUTE: 2.6 K/UL (ref 1.7–7.7)
NEUTROPHILS RELATIVE PERCENT: 58.6 %
PDW BLD-RTO: 13.1 % (ref 12.4–15.4)
PLATELET # BLD: 254 K/UL (ref 135–450)
PMV BLD AUTO: 8.4 FL (ref 5–10.5)
POTASSIUM SERPL-SCNC: 4.9 MMOL/L (ref 3.5–5.1)
RBC # BLD: 4.05 M/UL (ref 4–5.2)
SODIUM BLD-SCNC: 141 MMOL/L (ref 136–145)
TOTAL PROTEIN: 7.1 G/DL (ref 6.4–8.2)
TRIGL SERPL-MCNC: 44 MG/DL (ref 0–150)
TSH REFLEX: 3.11 UIU/ML (ref 0.27–4.2)
VITAMIN D 25-HYDROXY: 92.4 NG/ML
VLDLC SERPL CALC-MCNC: 9 MG/DL
WBC # BLD: 4.5 K/UL (ref 4–11)

## 2022-11-02 LAB
ESTIMATED AVERAGE GLUCOSE: 88.2 MG/DL
HBA1C MFR BLD: 4.7 %

## 2022-11-04 ENCOUNTER — OFFICE VISIT (OUTPATIENT)
Dept: FAMILY MEDICINE CLINIC | Age: 52
End: 2022-11-04
Payer: COMMERCIAL

## 2022-11-04 VITALS
OXYGEN SATURATION: 98 % | DIASTOLIC BLOOD PRESSURE: 60 MMHG | WEIGHT: 126 LBS | BODY MASS INDEX: 21.51 KG/M2 | TEMPERATURE: 98 F | SYSTOLIC BLOOD PRESSURE: 116 MMHG | HEIGHT: 64 IN | HEART RATE: 72 BPM

## 2022-11-04 DIAGNOSIS — D75.89 MACROCYTOSIS: ICD-10-CM

## 2022-11-04 DIAGNOSIS — Z12.31 ENCOUNTER FOR SCREENING MAMMOGRAM FOR MALIGNANT NEOPLASM OF BREAST: ICD-10-CM

## 2022-11-04 DIAGNOSIS — Z00.00 ENCOUNTER FOR WELL ADULT EXAM WITHOUT ABNORMAL FINDINGS: Primary | ICD-10-CM

## 2022-11-04 DIAGNOSIS — K59.00 CONSTIPATION, UNSPECIFIED CONSTIPATION TYPE: ICD-10-CM

## 2022-11-04 DIAGNOSIS — F40.243 FEAR OF FLYING: ICD-10-CM

## 2022-11-04 LAB
FOLATE: 16.81 NG/ML (ref 4.78–24.2)
VITAMIN B-12: 950 PG/ML (ref 211–911)

## 2022-11-04 PROCEDURE — 99396 PREV VISIT EST AGE 40-64: CPT | Performed by: FAMILY MEDICINE

## 2022-11-04 RX ORDER — MONTELUKAST SODIUM 10 MG/1
10 TABLET ORAL DAILY
Qty: 30 TABLET | Refills: 0 | Status: SHIPPED | OUTPATIENT
Start: 2022-11-04

## 2022-11-04 RX ORDER — SENNA AND DOCUSATE SODIUM 50; 8.6 MG/1; MG/1
2 TABLET, FILM COATED ORAL DAILY
Qty: 60 TABLET | Refills: 1 | Status: SHIPPED | OUTPATIENT
Start: 2022-11-04

## 2022-11-04 RX ORDER — LORAZEPAM 0.5 MG/1
TABLET ORAL
Qty: 10 TABLET | Refills: 0 | Status: SHIPPED | OUTPATIENT
Start: 2022-11-04 | End: 2023-11-04

## 2022-11-04 SDOH — ECONOMIC STABILITY: INCOME INSECURITY: IN THE LAST 12 MONTHS, WAS THERE A TIME WHEN YOU WERE NOT ABLE TO PAY THE MORTGAGE OR RENT ON TIME?: NO

## 2022-11-04 SDOH — ECONOMIC STABILITY: FOOD INSECURITY: WITHIN THE PAST 12 MONTHS, YOU WORRIED THAT YOUR FOOD WOULD RUN OUT BEFORE YOU GOT MONEY TO BUY MORE.: NEVER TRUE

## 2022-11-04 SDOH — ECONOMIC STABILITY: HOUSING INSECURITY
IN THE LAST 12 MONTHS, WAS THERE A TIME WHEN YOU DID NOT HAVE A STEADY PLACE TO SLEEP OR SLEPT IN A SHELTER (INCLUDING NOW)?: NO

## 2022-11-04 SDOH — ECONOMIC STABILITY: FOOD INSECURITY: WITHIN THE PAST 12 MONTHS, THE FOOD YOU BOUGHT JUST DIDN'T LAST AND YOU DIDN'T HAVE MONEY TO GET MORE.: NEVER TRUE

## 2022-11-04 ASSESSMENT — PATIENT HEALTH QUESTIONNAIRE - PHQ9
SUM OF ALL RESPONSES TO PHQ QUESTIONS 1-9: 2
9. THOUGHTS THAT YOU WOULD BE BETTER OFF DEAD, OR OF HURTING YOURSELF: 0
5. POOR APPETITE OR OVEREATING: 0
1. LITTLE INTEREST OR PLEASURE IN DOING THINGS: 0
SUM OF ALL RESPONSES TO PHQ QUESTIONS 1-9: 2
6. FEELING BAD ABOUT YOURSELF - OR THAT YOU ARE A FAILURE OR HAVE LET YOURSELF OR YOUR FAMILY DOWN: 0
2. FEELING DOWN, DEPRESSED OR HOPELESS: 0
SUM OF ALL RESPONSES TO PHQ QUESTIONS 1-9: 2
7. TROUBLE CONCENTRATING ON THINGS, SUCH AS READING THE NEWSPAPER OR WATCHING TELEVISION: 0
SUM OF ALL RESPONSES TO PHQ QUESTIONS 1-9: 2
3. TROUBLE FALLING OR STAYING ASLEEP: 1
SUM OF ALL RESPONSES TO PHQ9 QUESTIONS 1 & 2: 0
4. FEELING TIRED OR HAVING LITTLE ENERGY: 1
10. IF YOU CHECKED OFF ANY PROBLEMS, HOW DIFFICULT HAVE THESE PROBLEMS MADE IT FOR YOU TO DO YOUR WORK, TAKE CARE OF THINGS AT HOME, OR GET ALONG WITH OTHER PEOPLE: 0
8. MOVING OR SPEAKING SO SLOWLY THAT OTHER PEOPLE COULD HAVE NOTICED. OR THE OPPOSITE, BEING SO FIGETY OR RESTLESS THAT YOU HAVE BEEN MOVING AROUND A LOT MORE THAN USUAL: 0

## 2022-11-04 ASSESSMENT — ANXIETY QUESTIONNAIRES
2. NOT BEING ABLE TO STOP OR CONTROL WORRYING: 0
6. BECOMING EASILY ANNOYED OR IRRITABLE: 0
7. FEELING AFRAID AS IF SOMETHING AWFUL MIGHT HAPPEN: 0
1. FEELING NERVOUS, ANXIOUS, OR ON EDGE: 0
IF YOU CHECKED OFF ANY PROBLEMS ON THIS QUESTIONNAIRE, HOW DIFFICULT HAVE THESE PROBLEMS MADE IT FOR YOU TO DO YOUR WORK, TAKE CARE OF THINGS AT HOME, OR GET ALONG WITH OTHER PEOPLE: NOT DIFFICULT AT ALL
3. WORRYING TOO MUCH ABOUT DIFFERENT THINGS: 0
5. BEING SO RESTLESS THAT IT IS HARD TO SIT STILL: 0
GAD7 TOTAL SCORE: 0
4. TROUBLE RELAXING: 0

## 2022-11-04 ASSESSMENT — ENCOUNTER SYMPTOMS
VOMITING: 0
COLOR CHANGE: 0
BACK PAIN: 0
SHORTNESS OF BREATH: 0
BLOOD IN STOOL: 0
NAUSEA: 0
TROUBLE SWALLOWING: 0
ABDOMINAL PAIN: 0
CONSTIPATION: 1
DIARRHEA: 0
COUGH: 1

## 2022-11-04 ASSESSMENT — SOCIAL DETERMINANTS OF HEALTH (SDOH): HOW HARD IS IT FOR YOU TO PAY FOR THE VERY BASICS LIKE FOOD, HOUSING, MEDICAL CARE, AND HEATING?: NOT HARD AT ALL

## 2022-11-04 NOTE — PROGRESS NOTES
Well Adult Note  Name: Goldy Song Date: 2022   MRN: [id number] Sex: Female   Age: 45 y.o. Ethnicity: Non- / Non    : 1970 Race: White (non-)      Sherly Mu is here for well adult exam.  History:    Patient's last menstrual period was 2019. menstrual cycle: n/a   Last PAP:  normal with neg HPV   Follows with gynecology? no    Last Mammogram: yes - , reassuring  Family Hx of ovarian, breast, or uterine cancer: no new  Self-breast exams: yes - no concerns     Previous DEXA scan: n/a    Previous Colonoscopy: yes - , Dr. Aston Ledesma, 10 yr f/u   BRBR, unexplained WL, bloating, abd pain: No  + constipation, bought citracel   Family Hx of Colon Ca:  no new     Diet: intermittent fasting   Exercise: yoga, pilates, went to PT for neck/back   Takes classes at the gym   Walks around the neighborhood  Treadmill, elliptical     Review of Systems   Constitutional:  Negative for activity change, appetite change, chills, diaphoresis, fatigue, fever and unexpected weight change. HENT:  Positive for congestion. Negative for ear pain, hearing loss and trouble swallowing. Eyes:  Negative for visual disturbance. Respiratory:  Positive for cough. Negative for shortness of breath. Cardiovascular:  Negative for chest pain, palpitations and leg swelling. Gastrointestinal:  Positive for constipation. Negative for abdominal pain, blood in stool, diarrhea, nausea and vomiting. Genitourinary:  Negative for decreased urine volume, difficulty urinating, dysuria, flank pain, hematuria and urgency. Musculoskeletal:  Positive for arthralgias. Negative for back pain. Skin:  Negative for color change and rash. Neurological:  Negative for dizziness, weakness, light-headedness, numbness and headaches. Psychiatric/Behavioral:  Negative for dysphoric mood and sleep disturbance. The patient is not nervous/anxious.       No Known Allergies      Prior to Visit Medications    Medication Sig Taking? Authorizing Provider   TESTOSTERONE BU Take by mouth E-3/Testosterone 2.5/2.5 mg Triturate Yes Historical Provider, MD   montelukast (SINGULAIR) 10 MG tablet Take 1 tablet by mouth daily Yes Coral Trevino MD   LORazepam (ATIVAN) 0.5 MG tablet Take one tablet by mouth as needed prior to flying. Yes Coral Trevino MD   sennosides-docusate sodium (SENOKOT-S) 8.6-50 MG tablet Take 2 tablets by mouth daily Yes Coral Trevino MD   diclofenac sodium (VOLTAREN) 1 % GEL Apply 2 g topically 4 times daily Yes Coral Trevino MD   fluticasone (FLONASE) 50 MCG/ACT nasal spray USE 1 SPRAY IN EACH NOSTRIL TWO TIMES A DAY Yes ELENA Paris CNP   SUMAtriptan (IMITREX) 100 MG tablet Take 1 tablet by mouth as needed for Migraine Yes ELENA Paris CNP   Cholecalciferol (VITAMIN D) 50 MCG (2000 UT) CAPS capsule Take 1 capsule by mouth daily Yes Historical Provider, MD   cetirizine (ZYRTEC) 10 MG tablet Take 10 mg by mouth daily Yes Historical Provider, MD   Famotidine (PEPCID PO) Take by mouth as needed Yes Historical Provider, MD   Multiple Vitamin (MULTI-VITAMIN DAILY PO) Take by mouth daily Yes Historical Provider, MD   CALCIUM PO Take by mouth daily Yes Historical Provider, MD   Omega-3 Fatty Acids (FISH OIL PO) Take by mouth daily Yes Historical Provider, MD   ketoconazole (NIZORAL) 2 % shampoo Apply topically daily as needed for Itching (psoriasis) Apply topically daily as needed. Yes Historical Provider, MD   fluocinonide (LIDEX) 0.05 % external solution Apply topically 2 times daily Apply topically 2 times daily. Yes Historical Provider, MD   desonide (DESOWEN) 0.05 % cream Apply topically 2 times daily Apply topically 2 times daily. Yes Historical Provider, MD   ketoconazole (NIZORAL) 2 % cream Apply topically daily Apply topically daily.  Yes Historical Provider, MD         Past Medical History:   Diagnosis Date    Allergic rhinitis     Anxiety     Esophageal reflux 2/13/2020    Headache     Rash     Recurrent upper respiratory infection (URI)     Spondylosis without myelopathy or radiculopathy, cervical region 11/13/2018    Tinnitus        Past Surgical History:   Procedure Laterality Date    COLONOSCOPY  08/07/2020    NL    COLONOSCOPY N/A 8/7/2020    COLONOSCOPY DIAGNOSTIC performed by Magali Walker MD at [address]         Family History   Problem Relation Age of Onset    Arthritis Mother     Osteoporosis Mother     No Known Problems Father     Diabetes Maternal Grandfather     Diabetes Paternal Grandmother     Arthritis Maternal Grandmother        Social History     Tobacco Use    Smoking status: Never    Smokeless tobacco: Never   Substance Use Topics    Alcohol use: Yes     Alcohol/week: 5.0 standard drinks     Types: 2 Glasses of wine, 3 Drinks containing 0.5 oz of alcohol per week     Comment: occasionally    Drug use: No       Objective   /60   Pulse 72   Temp 98 °F (36.7 °C)   Ht 5' 3.5\" (1.613 m)   Wt 126 lb (57.2 kg)   LMP 06/23/2019   SpO2 98%   BMI 21.97 kg/m²   Wt Readings from Last 3 Encounters:   11/04/22 126 lb (57.2 kg)   10/19/22 125 lb (56.7 kg)   11/03/21 127 lb (57.6 kg)     There were no vitals filed for this visit. Physical Exam  Vitals and nursing note reviewed. Constitutional:       General: She is not in acute distress. Appearance: She is well-developed. She is not diaphoretic. HENT:      Head: Normocephalic and atraumatic. Right Ear: External ear normal.      Left Ear: External ear normal.      Mouth/Throat:      Pharynx: No oropharyngeal exudate. Eyes:      General:         Right eye: No discharge. Left eye: No discharge. Conjunctiva/sclera: Conjunctivae normal.      Pupils: Pupils are equal, round, and reactive to light. Neck:      Thyroid: No thyromegaly. Cardiovascular:      Rate and Rhythm: Normal rate and regular rhythm. Heart sounds: Normal heart sounds.  No murmur heard.    No friction rub. No gallop. Pulmonary:      Effort: Pulmonary effort is normal. No respiratory distress. Breath sounds: Normal breath sounds. No wheezing or rales. Abdominal:      General: Bowel sounds are normal. There is no distension. Palpations: Abdomen is soft. Tenderness: There is no abdominal tenderness. There is no guarding or rebound. Musculoskeletal:         General: Normal range of motion. Cervical back: Normal range of motion and neck supple. Lymphadenopathy:      Cervical: No cervical adenopathy. Skin:     General: Skin is warm and dry. Coloration: Skin is not pale. Findings: No erythema or rash. Neurological:      Mental Status: She is alert. Cranial Nerves: No cranial nerve deficit. Coordination: Coordination normal.   Psychiatric:         Behavior: Behavior normal.         Thought Content: Thought content normal.         Judgment: Judgment normal.         Assessment   Plan   1. Encounter for well adult exam without abnormal findings  Labs reviewed  UTD cancer screenings  Encouraged COVID vaccine in January, had COVID in October   Obtained flu vaccine last week   2. Fear of flying  -     LORazepam (ATIVAN) 0.5 MG tablet; Take one tablet by mouth as needed prior to flying., Disp-10 tablet, R-0Normal  3. Encounter for screening mammogram for malignant neoplasm of breast  -     PAULINE DIGITAL SCREEN W OR WO CAD BILATERAL; Future  4. Macrocytosis  -     Vitamin B12; Future  -     Folate; Future  -     Path Review, Smear; Future  5. Constipation, unspecified constipation type  -     sennosides-docusate sodium (SENOKOT-S) 8.6-50 MG tablet;  Take 2 tablets by mouth daily, Disp-60 tablet, R-1Normal   Increase fiber/water intake, c/w OTC Citracel for now         Personalized Preventive Plan   Current Health Maintenance Status  Immunization History   Administered Date(s) Administered    COVID-19, MODERNA BLUE border, Primary or Immunocompromised, (age 12y+), IM, 100 mcg/0.5mL 01/07/2021, 02/04/2021, 11/12/2021    Influenza Virus Vaccine 10/11/2011, 11/07/2014, 11/01/2016    Influenza, FLUARIX, FLULAVAL, Ilean Condon (age 10 mo+) AND AFLURIA, (age 1 y+), PF, 0.5mL 10/18/2018, 09/29/2020    Influenza, FLUBLOK, (age 25 y+), PF, 0.5mL 10/31/2019    Influenza, FLUCELVAX, (age 10 mo+), MDCK, PF, 0.5mL 11/03/2021    Tdap (Boostrix, Adacel) 09/08/2009, 10/31/2019    Zoster Recombinant (Shingrix) 10/31/2019, 11/12/2020        Health Maintenance   Topic Date Due    COVID-19 Vaccine (4 - Booster for Moderna series) 01/07/2022    Flu vaccine (1) 08/01/2022    Depression Screen  11/03/2022    Breast cancer screen  04/29/2023    Cervical cancer screen  07/08/2026    Lipids  11/01/2027    DTaP/Tdap/Td vaccine (3 - Td or Tdap) 10/31/2029    Colorectal Cancer Screen  08/07/2030    Shingles vaccine  Completed    Hepatitis C screen  Completed    HIV screen  Completed    Hepatitis A vaccine  Aged Out    Hib vaccine  Aged Out    Meningococcal (ACWY) vaccine  Aged Out    Pneumococcal 0-64 years Vaccine  Aged Out     Recommendations for [employer] Due: see orders and patient instructions/AVS.    Return if symptoms worsen or fail to improve.

## 2022-11-05 LAB — BLOOD SMEAR REVIEW: NORMAL

## 2022-11-07 LAB — HEMATOLOGY PATH CONSULT: NORMAL

## 2022-11-09 DIAGNOSIS — J30.2 SEASONAL ALLERGIC RHINITIS, UNSPECIFIED TRIGGER: ICD-10-CM

## 2022-11-09 RX ORDER — FLUTICASONE PROPIONATE 50 MCG
SPRAY, SUSPENSION (ML) NASAL
Qty: 1 EACH | Refills: 2 | Status: SHIPPED | OUTPATIENT
Start: 2022-11-09

## 2022-11-09 NOTE — TELEPHONE ENCOUNTER
.Refill Request     CONFIRM preferrred pharmacy with the patient. If Mail Order Rx - Pend for 90 day refill. Last Seen: Last Seen Department: 11/4/2022  Last Seen by PCP: 11/3/2021    Last Written: 11-3-21 1 with 2     If no future appointment scheduled, route STAFF MESSAGE with patient name to the [facility] for scheduling. Next Appointment:   Future Appointments   Date Time Provider Ethel Dugan   11/6/2023  9:00 AM MD SHAKA Hsu  [facility] - [facility]       Message sent to [address] [place] [address] to schedule appt with patient?   N/A      Requested Prescriptions     Pending Prescriptions Disp Refills    fluticasone (FLONASE) 50 MCG/ACT nasal spray 1 each 2     Sig: USE 1 SPRAY IN EACH NOSTRIL TWO TIMES A DAY

## 2022-11-10 ENCOUNTER — TELEPHONE (OUTPATIENT)
Dept: FAMILY MEDICINE CLINIC | Age: 52
End: 2022-11-10

## 2022-11-10 NOTE — TELEPHONE ENCOUNTER
Results reviewed. Normal/neg/stable with the following exceptions:     Vitamin B12 level is too high. Would recommend decreasing any supplementation to every other day. This would not explain the increased size of her red blood cells     Her liver tests and thyroid tests were normal. Other causes for this are consistent alcohol use and different medications, but she doesn't appear to be on any that would cause this. Please inquire about alcohol intake and encourage cessation. If no alcohol use involved, we will need to refer her to hematology.

## 2022-11-10 NOTE — TELEPHONE ENCOUNTER
From: Noel Wilkinson  To: Dr. Freddy Snyder: 11/9/2022  3:42 PM EST  Subject: Blood smear results    Hello,  Do I need to be concerned about these results or do anything else?     Thanks,  Dileep Mcbride

## 2022-11-11 NOTE — TELEPHONE ENCOUNTER
Patient called back and informed on all. Patient stated she does drink on average of one glass of alcohol a night. Patient is also taking a medication called valtrex every other day in the PM. Patient wants to know if that would have anything to do with it?  Please advise

## 2022-11-11 NOTE — TELEPHONE ENCOUNTER
Likely due to the alcohol. Would stop drinking alcohol for 3 months and we can repeat blood work then. If it's still elevated with alcohol cessation, then she will need to see a hematologist then. Please let her know.

## 2022-12-01 ENCOUNTER — HOSPITAL ENCOUNTER (OUTPATIENT)
Dept: WOMENS IMAGING | Age: 52
Discharge: HOME | End: 2022-12-01
Payer: COMMERCIAL

## 2022-12-01 ENCOUNTER — TELEPHONE (OUTPATIENT)
Dept: FAMILY MEDICINE CLINIC | Age: 52
End: 2022-12-01

## 2022-12-01 ENCOUNTER — TELEPHONE (OUTPATIENT)
Dept: WOMENS IMAGING | Age: 52
End: 2022-12-01

## 2022-12-01 VITALS — WEIGHT: 125 LBS | HEIGHT: 64 IN | BODY MASS INDEX: 21.34 KG/M2

## 2022-12-01 DIAGNOSIS — Z12.31 ENCOUNTER FOR SCREENING MAMMOGRAM FOR MALIGNANT NEOPLASM OF BREAST: ICD-10-CM

## 2022-12-01 PROCEDURE — 77067 SCR MAMMO BI INCL CAD: CPT

## 2022-12-01 NOTE — TELEPHONE ENCOUNTER
Called patient to go over negative mammogram results. Patient answered and is aware to return for screening mammogram in a year unless otherwise indicated by her physician.

## 2022-12-01 NOTE — TELEPHONE ENCOUNTER
From: Alvaro Jack  To: Dr. Kendrick Kohli: 12/1/2022 10:08 AM EST  Subject: Mammogram question    Hello,  My mammogram results were normal but this was noted in the findings. The words benign-appearing are a bit concerning and wondering if I should wait a year to be checked again or if any additional testing is warranted?     Stable small benign-appearing mass  is noted in the inferior left breast.

## 2022-12-05 ENCOUNTER — OFFICE VISIT (OUTPATIENT)
Dept: ENT CLINIC | Age: 52
End: 2022-12-05
Payer: COMMERCIAL

## 2022-12-05 VITALS
SYSTOLIC BLOOD PRESSURE: 120 MMHG | WEIGHT: 125 LBS | RESPIRATION RATE: 16 BRPM | DIASTOLIC BLOOD PRESSURE: 76 MMHG | HEART RATE: 62 BPM | TEMPERATURE: 97.2 F | HEIGHT: 64 IN | BODY MASS INDEX: 21.34 KG/M2

## 2022-12-05 DIAGNOSIS — R42 VERTIGO: ICD-10-CM

## 2022-12-05 DIAGNOSIS — L43.9 LICHEN PLANUS: Primary | ICD-10-CM

## 2022-12-05 PROCEDURE — 99213 OFFICE O/P EST LOW 20 MIN: CPT | Performed by: OTOLARYNGOLOGY

## 2022-12-05 RX ORDER — TRIAMCINOLONE ACETONIDE 0.1 %
PASTE (GRAM) DENTAL
Qty: 5 G | Refills: 1 | Status: SHIPPED | OUTPATIENT
Start: 2022-12-05

## 2022-12-05 NOTE — PATIENT INSTRUCTIONS
Canalith Repositioning (CRP) - \"Epley\" Maneuvers:            Post-Procedure Instructions:  -Keep chin straight forward to tipped down a bit  -Do not tip head back to drink water but use a straw  -Restrictions apply for the first 24 hours after the treatment. When reclining for sleep, sleep as near upright as possible. Ideally, the head should not tip backwards more than 30 degrees which means that the back cannot be tilted backwards more than 30 degrees.  -There is no limit to how many times the CRP (canalith repositioning procedures) can be re-done.  -If neurological symptoms (for example, weakness, numbness, visual changes other than vertigo) occur with the Epley maneuver please notify the clinic immediately     Call if no improvement in lichen planus in 2 weeks.

## 2022-12-05 NOTE — PROGRESS NOTES
[address] UP VISIT      Patient Name: Yisel Caceres  Medical Record Number:  [id number]  Primary Care Physician:  Alejandra Pizano MD    ChiefComplaint     Chief Complaint   Patient presents with    Follow-up     Patient states that she has been having issues with dizziness. Her symptoms have been coming and going. Since having Covid in early October she has a fuzzy feeling on inside of both cheeks. History of Present Illness     Yisel Caceres is an 45 y.o. female with ringing in the left ear since her 25s (had an ear infection, following this had tinnitus in the left ear), feels aural fullness and worsened tinnitus in the left ear. States she had an upper respiratory infection and congestion at the beginning of the year; she was treated with two courses of antibiotics however left aural fullness has not resolved. Denies otalgia, otorrhea, vertigo, no history of ear trauma or surgery. No history of chronic ear disease a sa child,no FHx of early hearing loss. Audiometric testing shows normal hearing loss bilaterally, however left type B tympanogram on immitance testing. States history of seasonal allergies, takes fluticasone, cetirizine year-round. This provides relief of head congestion - denies history of chronic rhinosinusitis (no facial pain, no purulent/green rhinorrhea, no anosmia/hyposmia)    Interval History 10/20/2021: Continued left ear tinnitus, high pitched, minimally intrusive (uses masking at night). No otorrhea, otalgia, ear fullness, vertigo. No recent ear infections. Managing ear wax with H2O2 drops. Of interest, patient has psoriasis of the bilateral ears and scalp, with occasional flare ups with pruritus. Patient previously with left ear fullness, however has resolved with ear autoinsufflation and flonase (occurred 2-3 weeks after last clinic visit).      Interval History 10/19/2022: COVID-19 contracted last week after a vacation - had fevers, chills, malaise, muscle aches after visiting [address] [facility] [place] - then developed severe dizziness with a \"floaty feeling\" - no joel room spinning but she felt she was slowly rocking back and forth. Improved with stationary positioning, eye closure, dramamine. This has largely improved, however has had several episodes over the past few days (2 yesterday, none today) has had episodes lasting 15-30 minutes. No hearing loss, no worsened tinnitus, no vertigo with episodes. She does yoga and pilates, but not recently. Has occasionally been using peroxide for wax management. Scalp/ear psoriasis has been managed with derm-otic, sparingly, which improves symptoms. Interval History 12/5/2022: Approximately 1 month ago, began to have joel vertigo - triggered by lying flat and looking to the right - lasts approximately <60s. Improves with eye close - afterwards she is off-balance \"for a little bit' afterwards - concern for positioning vertigo. No tinnitus or hearing loss with episodes. Last episode occurred approximately 1 week ago. She is also had a \"fuzzy\" feeling on the inside of her cheeks, no dysphagia/known aphasia, no voice changes, no fevers, chills, night sweats or lymphadenopathy.     Past Medical History     Past Medical History:   Diagnosis Date    Allergic rhinitis     Anxiety     Esophageal reflux 2/13/2020    Headache     Rash     Recurrent upper respiratory infection (URI)     Spondylosis without myelopathy or radiculopathy, cervical region 11/13/2018    Tinnitus        Past Surgical History     Past Surgical History:   Procedure Laterality Date    COLONOSCOPY  08/07/2020    NL    COLONOSCOPY N/A 8/7/2020    COLONOSCOPY DIAGNOSTIC performed by Johnathon Perrin MD at [address] [place] [address]       Family History     Family History   Problem Relation Age of Onset    Arthritis Mother     Osteoporosis Mother     No Known Problems Father     Diabetes Maternal Grandfather Diabetes Paternal Grandmother     Arthritis Maternal Grandmother        Social History     Social History     Tobacco Use    Smoking status: Never    Smokeless tobacco: Never   Substance Use Topics    Alcohol use: Yes     Alcohol/week: 5.0 standard drinks     Types: 2 Glasses of wine, 3 Drinks containing 0.5 oz of alcohol per week     Comment: occasionally    Drug use: No        Allergies     No Known Allergies    Medications     Current Outpatient Medications   Medication Sig Dispense Refill    triamcinolone acetonide (KENALOG) 0.1 % paste Apply thin film to mouth white spots twice daily x 10-14 days 5 g 1    fluticasone (FLONASE) 50 MCG/ACT nasal spray USE 1 SPRAY IN EACH NOSTRIL TWO TIMES A DAY 1 each 2    TESTOSTERONE BU Take by mouth E-3/Testosterone 2.5/2.5 mg Triturate      montelukast (SINGULAIR) 10 MG tablet Take 1 tablet by mouth daily 30 tablet 0    LORazepam (ATIVAN) 0.5 MG tablet Take one tablet by mouth as needed prior to flying. 10 tablet 0    sennosides-docusate sodium (SENOKOT-S) 8.6-50 MG tablet Take 2 tablets by mouth daily 60 tablet 1    diclofenac sodium (VOLTAREN) 1 % GEL Apply 2 g topically 4 times daily 100 g 1    SUMAtriptan (IMITREX) 100 MG tablet Take 1 tablet by mouth as needed for Migraine 9 tablet 0    Cholecalciferol (VITAMIN D) 50 MCG (2000 UT) CAPS capsule Take 1 capsule by mouth daily      cetirizine (ZYRTEC) 10 MG tablet Take 10 mg by mouth daily      Famotidine (PEPCID PO) Take by mouth as needed      Multiple Vitamin (MULTI-VITAMIN DAILY PO) Take by mouth daily      CALCIUM PO Take by mouth daily      Omega-3 Fatty Acids (FISH OIL PO) Take by mouth daily      ketoconazole (NIZORAL) 2 % shampoo Apply topically daily as needed for Itching (psoriasis) Apply topically daily as needed. fluocinonide (LIDEX) 0.05 % external solution Apply topically 2 times daily Apply topically 2 times daily.       desonide (DESOWEN) 0.05 % cream Apply topically 2 times daily Apply topically 2 times daily. ketoconazole (NIZORAL) 2 % cream Apply topically daily Apply topically daily. No current facility-administered medications for this visit. Review of Systems     REVIEW OF SYSTEMS  The following systems were reviewed and revealed the following in addition to any already discussed in the HPI:    CONSTITUTIONAL: No weight loss, no fever, no night sweats, no chills  EYES: no vision changes, no blurry vision  EARS: no changes in hearing, no otalgia  NOSE: no epistaxis, no rhinorrhea  RESPIRATORY: No difficulty breathing, no shortness of breath  CV: no chest pain, no peripheral vascular disease  HEME: No coagulation disorder, no bleeding disorder  NEURO: No TIA or stroke-like symptoms  SKIN: No new rashes in the head and neck (has history of psoriasis of the head and neck), no recent skin cancers  MOUTH: No no new ulcers, no recent teeth infections  GASTROINTESTINAL: No diarrhea, stomach pain  PSYCH: No anxiety, no depression    PhysicalExam     Vitals:    12/05/22 1036   BP: 120/76   Site: Right Upper Arm   Position: Sitting   Cuff Size: Medium Adult   Pulse: 62   Resp: 16   Temp: 97.2 °F (36.2 °C)   TempSrc: Infrared   Weight: 125 lb (56.7 kg)   Height: 5' 4\" (1.626 m)         PHYSICAL EXAM  /76 (Site: Right Upper Arm, Position: Sitting, Cuff Size: Medium Adult)   Pulse 62   Temp 97.2 °F (36.2 °C) (Infrared)   Resp 16   Ht 5' 4\" (1.626 m)   Wt 125 lb (56.7 kg)   LMP 06/23/2019   BMI 21.46 kg/m²     GENERAL: No Acute Distress, Alert and Oriented, no hoarseness  EYES: EOMI, Anti-icteric  NOSE: On anterior rhinoscopy there is no epistaxis, nasal mucosa within normal limits, no purulent drainage  EARS: Normal external appearance; there is silver scaling over the right postauricular skin consistent with psoriasis.   On portable otomicroscopy:  -Right ear: External auditory canal without stenosis, tympanic membrane obscured by cerumen  -Left ear: External auditory canal without stenosis, tympanic membrane obscured by cerumen  FACE: 1/6 House-Brackmann Scale, symmetric, sensation equal bilaterally  ORAL CAVITY: Lichen planus noted over the bilateral buccal mucosa//retromandibular trigone areas  NECK: Normal range of motion, no thyromegaly, trachea is midline, no lymphadenopathy, no neck masses, no crepitus  CHEST: Normal respiratory effort, no retractions, breathing comfortably  SKIN: No rashes, normal appearing skin, no evidence of skin lesions/tumors  NEURO: CN 2, 3, 4, 5, 6, 7, 11, 12 intact bilaterally   -No corrective saccade on head thrust testing  -Bilateral [place]-Hallpike and lateral supine roll testing without vertigo or nystagmus      Data Review          Prior Audiogram           Assessment and Plan      Diagnosis Orders   1. Lichen planus  triamcinolone acetonide (KENALOG) 0.1 % paste      2. Vertigo          64-year-old female with left ear tinnitus since childhood following an ear infection. This is high-pitched in nature, constant, unchanged. She also psoriasis of the scalp and ears, with occasional pruritus and inflammation of the external auditory canal.  We prescribed her Derm otic for this on an as-needed basis and she is doing well. Finally, she has had non-spinning vertigo/dysequilibrium after ousmane COVID-19 - no joel imbalance at last visit, head thrust testing negative, have asked her to attempt exercises that stress her balance systems (pilates, yoga) which worked well for her, however over the past month she has had the development of true vertigo which is positioning in nature, and resolved approximately 1 week ago. Examination including Bert-Hallpike, lateral supine roll, head thrust, skew deviation without findings-may have had BPPV and self corrected, but we have outlined Epley maneuvers for the bilateral posterior semicircular canals with her and she will perform this depending on which side is worse.     She also has lichen planus of the bilateral retromolar trigone/buccal mucosal areas, and we will start her on Kenalog paste to this area -she will see us back if there is no improvement    No follow-ups on file. Ronna Jack MD  [address] [place] [address],[address]  Department of Otolaryngology/Head and Neck Surgery  12/5/22    I have performed a head and neck physical exam personally or was physically present during the key or critical portions of the service. Medical Decision Making: The following items were considered in medical decision making:  Independent review of images  Review / order clinical lab tests  Review / order radiology tests  Decision to obtain old records  Review and summation of old records as accessed through [facility] (a summary of my findings in these old records: reviewed records from 1/8/2020 from [facility])     Portions of this note were dictated using Dragon.  There may be linguistic errors secondary to the use of this program.

## 2022-12-05 NOTE — Clinical Note
Dr. Daquan Nunez, this patient had disequilibrium after ousmane COVID-19 without joel vertigo 10/2022, and we had her do yoga and Pilates and she improved significantly from a balance perspective. However, she has had the development of joel vertigo over the past month, with positioning as a trigger-I suspect BPPV, and her last episode was 1 week ago; in the clinic I could not reproduce her vertigo, which makes me think that she self corrected. She also has lichen planus of the bilateral buccal mucosa, I am uncertain of the etiology of this but she does have some risk factors for autoimmune disease include psoriasis of the scalp and ears -I started her on topical triamcinolone to the area and she will return to clinic in 2-4 weeks if there is no improvement. Thanks again for referring her to our clinic, and have asked her to call me if she has any further vertigo  Best, Abdifatah BURGESS

## 2022-12-08 NOTE — TELEPHONE ENCOUNTER
Yes, yearly is what was recommended based on the images. If symptoms change or she notes any changes in the region they described, then a earlier repeat image would be warranted. At this time, that area is not worrisome.

## 2023-05-03 ENCOUNTER — HOSPITAL ENCOUNTER (OUTPATIENT)
Dept: GENERAL RADIOLOGY | Age: 53
Discharge: HOME | End: 2023-05-03
Payer: COMMERCIAL

## 2023-05-03 ENCOUNTER — HOSPITAL ENCOUNTER (OUTPATIENT)
Age: 53
Discharge: HOME | End: 2023-05-03
Payer: COMMERCIAL

## 2023-05-03 ENCOUNTER — OFFICE VISIT (OUTPATIENT)
Dept: FAMILY MEDICINE CLINIC | Age: 53
End: 2023-05-03
Payer: COMMERCIAL

## 2023-05-03 VITALS
HEART RATE: 67 BPM | SYSTOLIC BLOOD PRESSURE: 110 MMHG | HEIGHT: 64 IN | BODY MASS INDEX: 21.65 KG/M2 | OXYGEN SATURATION: 99 % | WEIGHT: 126.8 LBS | TEMPERATURE: 98.1 F | DIASTOLIC BLOOD PRESSURE: 60 MMHG

## 2023-05-03 DIAGNOSIS — R10.2 SUPRAPUBIC PAIN: Primary | ICD-10-CM

## 2023-05-03 DIAGNOSIS — R10.2 SUPRAPUBIC PAIN: ICD-10-CM

## 2023-05-03 LAB
BILIRUBIN, POC: NEGATIVE
BLOOD URINE, POC: NORMAL
CLARITY, POC: CLEAR
COLOR, POC: YELLOW
GLUCOSE URINE, POC: NEGATIVE
KETONES, POC: NEGATIVE
LEUKOCYTE EST, POC: NEGATIVE
NITRITE, POC: NEGATIVE
PH, POC: 7
PROTEIN, POC: NEGATIVE
SPECIFIC GRAVITY, POC: 1.01
UROBILINOGEN, POC: 0.2

## 2023-05-03 PROCEDURE — 74019 RADEX ABDOMEN 2 VIEWS: CPT

## 2023-05-03 PROCEDURE — 99214 OFFICE O/P EST MOD 30 MIN: CPT | Performed by: FAMILY MEDICINE

## 2023-05-03 PROCEDURE — 81002 URINALYSIS NONAUTO W/O SCOPE: CPT | Performed by: FAMILY MEDICINE

## 2023-05-03 ASSESSMENT — PATIENT HEALTH QUESTIONNAIRE - PHQ9
9. THOUGHTS THAT YOU WOULD BE BETTER OFF DEAD, OR OF HURTING YOURSELF: 0
SUM OF ALL RESPONSES TO PHQ QUESTIONS 1-9: 1
10. IF YOU CHECKED OFF ANY PROBLEMS, HOW DIFFICULT HAVE THESE PROBLEMS MADE IT FOR YOU TO DO YOUR WORK, TAKE CARE OF THINGS AT HOME, OR GET ALONG WITH OTHER PEOPLE: 0
5. POOR APPETITE OR OVEREATING: 0
4. FEELING TIRED OR HAVING LITTLE ENERGY: 0
7. TROUBLE CONCENTRATING ON THINGS, SUCH AS READING THE NEWSPAPER OR WATCHING TELEVISION: 0
SUM OF ALL RESPONSES TO PHQ9 QUESTIONS 1 & 2: 0
SUM OF ALL RESPONSES TO PHQ QUESTIONS 1-9: 1
1. LITTLE INTEREST OR PLEASURE IN DOING THINGS: 0
SUM OF ALL RESPONSES TO PHQ QUESTIONS 1-9: 1
SUM OF ALL RESPONSES TO PHQ QUESTIONS 1-9: 1
8. MOVING OR SPEAKING SO SLOWLY THAT OTHER PEOPLE COULD HAVE NOTICED. OR THE OPPOSITE, BEING SO FIGETY OR RESTLESS THAT YOU HAVE BEEN MOVING AROUND A LOT MORE THAN USUAL: 0
3. TROUBLE FALLING OR STAYING ASLEEP: 1
6. FEELING BAD ABOUT YOURSELF - OR THAT YOU ARE A FAILURE OR HAVE LET YOURSELF OR YOUR FAMILY DOWN: 0
2. FEELING DOWN, DEPRESSED OR HOPELESS: 0

## 2023-05-03 ASSESSMENT — ANXIETY QUESTIONNAIRES
1. FEELING NERVOUS, ANXIOUS, OR ON EDGE: 1
5. BEING SO RESTLESS THAT IT IS HARD TO SIT STILL: 0
3. WORRYING TOO MUCH ABOUT DIFFERENT THINGS: 0
2. NOT BEING ABLE TO STOP OR CONTROL WORRYING: 0
4. TROUBLE RELAXING: 0
IF YOU CHECKED OFF ANY PROBLEMS ON THIS QUESTIONNAIRE, HOW DIFFICULT HAVE THESE PROBLEMS MADE IT FOR YOU TO DO YOUR WORK, TAKE CARE OF THINGS AT HOME, OR GET ALONG WITH OTHER PEOPLE: NOT DIFFICULT AT ALL
6. BECOMING EASILY ANNOYED OR IRRITABLE: 0
GAD7 TOTAL SCORE: 1
7. FEELING AFRAID AS IF SOMETHING AWFUL MIGHT HAPPEN: 0

## 2023-05-03 ASSESSMENT — ENCOUNTER SYMPTOMS
ABDOMINAL DISTENTION: 0
ABDOMINAL PAIN: 1
DIARRHEA: 0
VOMITING: 0
BLOOD IN STOOL: 0
SHORTNESS OF BREATH: 0
NAUSEA: 0
RECTAL PAIN: 0
ANAL BLEEDING: 0

## 2023-05-04 LAB — BACTERIA UR CULT: NORMAL

## 2023-05-05 ENCOUNTER — TELEPHONE (OUTPATIENT)
Dept: FAMILY MEDICINE CLINIC | Age: 53
End: 2023-05-05

## 2023-05-08 NOTE — TELEPHONE ENCOUNTER
Urine culture did not show any growth of bacteria, no treatment is needed     Abd XR showed mild constipation. Would recommend a bowel clean out with Miralax (1 cap full daily with 8 oz of water) until she has consistent daily soft bowel movements. If + diarrhea, then decrease to a half cap. Will need to repeat UA in 6 weeks.  If there is still blood at that time, (there was a trace amount which could be coming from the straining from the constipation) she will need to see a urologist.

## 2023-05-08 NOTE — TELEPHONE ENCOUNTER
LMOM for pt to return phone call to the office Kl[facility]ri Pregnancy And Lactation Text: It is unknown if this medication can harm a developing fetus or if it is excreted in breast milk.

## 2023-05-08 NOTE — TELEPHONE ENCOUNTER
Patient notified of results. She has had constipation issues for the last 6 months up to 1 year. She drinks Citracal daily and eats the sean seed bars 2-3 times weekly. The abdominal pain still occurs infrequently, no pattern that she can see at this time (keeping a diary since her visit). Patient would like to know if she should take the Senna tabs as instructed at her appt with the Miralax or just the Miralax.  Also, she was wondering what the longer term solution would be, should she follow up with GI?

## 2023-05-09 NOTE — TELEPHONE ENCOUNTER
I would see how she responds to miralax first for a clean out. Can add senna later if needed. She can f/u with GI but it's not absolutely necessary at this time. If she doesn't respond well to the miralax clean out or continues to have problems, then I would recommend seeing GI.

## 2023-05-22 ASSESSMENT — ENCOUNTER SYMPTOMS: CONSTIPATION: 1

## 2023-06-22 ENCOUNTER — TELEPHONE (OUTPATIENT)
Dept: ENT CLINIC | Age: 53
End: 2023-06-22

## (undated) DEVICE — ENDO CARRY-ON PROCEDURE KIT INCLUDES SUCTION TUBING, LUBRICANT, GAUZE, BIOHAZARD STICKER, TRANSPORT PAD AND INTERCEPT BEDSIDE KIT.: Brand: ENDO CARRY-ON PROCEDURE KIT

## (undated) DEVICE — ELECTRODE ECG MONITR FOAM TEAR DROP ADLT RED

## (undated) DEVICE — GOWN IMPERV UNIV BLU PLAS FLM PERF OPN BK W THUMBHOOKS

## (undated) DEVICE — AIRLIFE™ NASAL OXYGEN CANNULA CURVED, NONFLARED TIP, WITH 7' FEET (2.1 M) CRUSH RESISTANT TUBING, OVER-THE-EAR STYLE: Brand: AIRLIFE™